# Patient Record
Sex: FEMALE | Race: WHITE | Employment: FULL TIME | URBAN - METROPOLITAN AREA
[De-identification: names, ages, dates, MRNs, and addresses within clinical notes are randomized per-mention and may not be internally consistent; named-entity substitution may affect disease eponyms.]

---

## 2017-01-26 RX ORDER — LISINOPRIL 10 MG/1
TABLET ORAL
Qty: 90 TABLET | Refills: 0 | Status: SHIPPED | OUTPATIENT
Start: 2017-01-26 | End: 2017-04-26 | Stop reason: SDUPTHER

## 2017-04-01 LAB
BUN BLDV-MCNC: 14 MG/DL
CALCIUM SERPL-MCNC: 10.3 MG/DL
CHLORIDE BLD-SCNC: 105 MMOL/L
CO2: 27 MMOL/L
CREAT SERPL-MCNC: 0.65 MG/DL
GFR CALCULATED: >60
GLUCOSE BLD-MCNC: 84 MG/DL
POTASSIUM SERPL-SCNC: 4.6 MMOL/L
SODIUM BLD-SCNC: 140 MMOL/L

## 2017-04-04 DIAGNOSIS — I10 ESSENTIAL HYPERTENSION: ICD-10-CM

## 2017-04-05 RX ORDER — PAROXETINE HYDROCHLORIDE 20 MG/1
TABLET, FILM COATED ORAL
Qty: 90 TABLET | Refills: 1 | Status: SHIPPED | OUTPATIENT
Start: 2017-04-05 | End: 2017-10-02 | Stop reason: SDUPTHER

## 2017-04-26 RX ORDER — LISINOPRIL 10 MG/1
TABLET ORAL
Qty: 90 TABLET | Refills: 0 | Status: SHIPPED | OUTPATIENT
Start: 2017-04-26 | End: 2017-07-25 | Stop reason: SDUPTHER

## 2017-05-15 ENCOUNTER — OFFICE VISIT (OUTPATIENT)
Dept: FAMILY MEDICINE CLINIC | Age: 62
End: 2017-05-15
Payer: COMMERCIAL

## 2017-05-15 VITALS
DIASTOLIC BLOOD PRESSURE: 68 MMHG | HEART RATE: 68 BPM | OXYGEN SATURATION: 98 % | RESPIRATION RATE: 16 BRPM | BODY MASS INDEX: 21.93 KG/M2 | SYSTOLIC BLOOD PRESSURE: 120 MMHG | WEIGHT: 140 LBS

## 2017-05-15 DIAGNOSIS — I10 ESSENTIAL HYPERTENSION: Primary | ICD-10-CM

## 2017-05-15 DIAGNOSIS — Z23 IMMUNIZATION DUE: ICD-10-CM

## 2017-05-15 PROCEDURE — 90736 HZV VACCINE LIVE SUBQ: CPT | Performed by: FAMILY MEDICINE

## 2017-05-15 PROCEDURE — 99213 OFFICE O/P EST LOW 20 MIN: CPT | Performed by: FAMILY MEDICINE

## 2017-05-15 PROCEDURE — 90471 IMMUNIZATION ADMIN: CPT | Performed by: FAMILY MEDICINE

## 2017-05-15 RX ORDER — KETOCONAZOLE 20 MG/ML
SHAMPOO TOPICAL
Refills: 5 | COMMUNITY
Start: 2017-03-11

## 2017-07-25 RX ORDER — LISINOPRIL 10 MG/1
TABLET ORAL
Qty: 90 TABLET | Refills: 0 | Status: SHIPPED | OUTPATIENT
Start: 2017-07-25 | End: 2017-10-23 | Stop reason: SDUPTHER

## 2017-07-26 RX ORDER — BUSPIRONE HYDROCHLORIDE 15 MG/1
TABLET ORAL
Qty: 90 TABLET | Refills: 1 | Status: SHIPPED | OUTPATIENT
Start: 2017-07-26 | End: 2018-07-20 | Stop reason: ALTCHOICE

## 2017-08-11 ENCOUNTER — OFFICE VISIT (OUTPATIENT)
Dept: FAMILY MEDICINE CLINIC | Age: 62
End: 2017-08-11
Payer: COMMERCIAL

## 2017-08-11 VITALS
HEART RATE: 77 BPM | WEIGHT: 140 LBS | RESPIRATION RATE: 16 BRPM | BODY MASS INDEX: 21.93 KG/M2 | OXYGEN SATURATION: 98 % | SYSTOLIC BLOOD PRESSURE: 138 MMHG | DIASTOLIC BLOOD PRESSURE: 76 MMHG

## 2017-08-11 DIAGNOSIS — M75.101 TEAR OF RIGHT ROTATOR CUFF, UNSPECIFIED TEAR EXTENT: Primary | ICD-10-CM

## 2017-08-11 PROCEDURE — 99213 OFFICE O/P EST LOW 20 MIN: CPT | Performed by: FAMILY MEDICINE

## 2017-08-16 ENCOUNTER — TELEPHONE (OUTPATIENT)
Dept: FAMILY MEDICINE CLINIC | Age: 62
End: 2017-08-16

## 2017-08-18 DIAGNOSIS — M75.101 TEAR OF RIGHT ROTATOR CUFF, UNSPECIFIED TEAR EXTENT: ICD-10-CM

## 2017-10-02 RX ORDER — PAROXETINE HYDROCHLORIDE 20 MG/1
TABLET, FILM COATED ORAL
Qty: 90 TABLET | Refills: 1 | Status: SHIPPED | OUTPATIENT
Start: 2017-10-02 | End: 2018-03-30 | Stop reason: SDUPTHER

## 2017-10-24 RX ORDER — LISINOPRIL 10 MG/1
TABLET ORAL
Qty: 90 TABLET | Refills: 0 | Status: SHIPPED | OUTPATIENT
Start: 2017-10-24 | End: 2018-01-22 | Stop reason: SDUPTHER

## 2017-11-17 ENCOUNTER — OFFICE VISIT (OUTPATIENT)
Dept: FAMILY MEDICINE CLINIC | Age: 62
End: 2017-11-17
Payer: COMMERCIAL

## 2017-11-17 VITALS
BODY MASS INDEX: 20.83 KG/M2 | HEART RATE: 74 BPM | OXYGEN SATURATION: 97 % | SYSTOLIC BLOOD PRESSURE: 120 MMHG | WEIGHT: 133 LBS | DIASTOLIC BLOOD PRESSURE: 70 MMHG

## 2017-11-17 DIAGNOSIS — I10 ESSENTIAL HYPERTENSION: Primary | ICD-10-CM

## 2017-11-17 DIAGNOSIS — Z98.1 S/P CERVICAL SPINAL FUSION: ICD-10-CM

## 2017-11-17 PROCEDURE — 99213 OFFICE O/P EST LOW 20 MIN: CPT | Performed by: FAMILY MEDICINE

## 2017-11-17 ASSESSMENT — PATIENT HEALTH QUESTIONNAIRE - PHQ9
1. LITTLE INTEREST OR PLEASURE IN DOING THINGS: 0
SUM OF ALL RESPONSES TO PHQ9 QUESTIONS 1 & 2: 0
2. FEELING DOWN, DEPRESSED OR HOPELESS: 0
SUM OF ALL RESPONSES TO PHQ QUESTIONS 1-9: 0

## 2017-11-17 NOTE — PROGRESS NOTES
Subjective:      Mine Mehta is a 60 y.o. female who presents for evaluation of hypertension. She indicates that she is feeling well and denies any symptoms referable to her elevated blood pressure. Specifically denies chest pain, palpitations, dyspnea, orthopnea, PND or peripheral edema. Current medication regimen is as listed below. Patient denies any side effects of medication. Recent cerv surg/fusion  Current Outpatient Prescriptions   Medication Sig Dispense Refill    lisinopril (PRINIVIL;ZESTRIL) 10 MG tablet TAKE 1 TABLET DAILY 90 tablet 0    PARoxetine (PAXIL) 20 MG tablet TAKE 1 TABLET DAILY 90 tablet 1    busPIRone (BUSPAR) 15 MG tablet 1/2 bid 90 tablet 1    ketoconazole (NIZORAL) 2 % shampoo apply topically to the affected areaone time daily as needed  5    SUMAtriptan (IMITREX) 100 MG tablet Take 1 tablet by mouth daily as needed for Migraine 30 tablet 3    Multiple Vitamins-Minerals (THERAPEUTIC MULTIVITAMIN-MINERALS) tablet Take 1 tablet by mouth daily      Calcium Carbonate-Vitamin D (CALCIUM-VITAMIN D) 500-200 MG-UNIT per tablet Take 1 tablet by mouth 2 times daily (with meals)      fluocinonide (LIDEX) 0.05 % cream Apply topically 2 times daily. 30 g 1    estradiol (ESTRING) 2 MG vaginal ring Place 2 mg vaginally once. Follow package directions.  Risedronate Sodium (ACTONEL) 150 MG TABS Take 150 mg by mouth every 30 days. No current facility-administered medications for this visit.         Allergies   Allergen Reactions    Percocet [Oxycodone-Acetaminophen] Other (See Comments)     hallucinations    Sulfa Antibiotics Rash       Social History   Substance Use Topics    Smoking status: Former Smoker     Types: Cigarettes     Quit date: 1/1/1997    Smokeless tobacco: Never Used    Alcohol use 1.0 oz/week     2 Standard drinks or equivalent per week          Objective:      /70   Pulse 74   Wt 133 lb (60.3 kg)   SpO2 97%   BMI 20.83 kg/m²   General: Appears well,

## 2018-01-22 RX ORDER — LISINOPRIL 10 MG/1
TABLET ORAL
Qty: 90 TABLET | Refills: 0 | Status: SHIPPED | OUTPATIENT
Start: 2018-01-22 | End: 2018-04-22 | Stop reason: SDUPTHER

## 2018-03-30 RX ORDER — PAROXETINE HYDROCHLORIDE 20 MG/1
TABLET, FILM COATED ORAL
Qty: 90 TABLET | Refills: 3 | Status: SHIPPED | OUTPATIENT
Start: 2018-03-30 | End: 2019-03-03 | Stop reason: SDUPTHER

## 2018-03-30 NOTE — TELEPHONE ENCOUNTER
Next Visit Date:  No future appointments.     Health Maintenance   Topic Date Due    Hepatitis C screen  1955    HIV screen  09/21/1969    Shingles Vaccine (1 of 2 - 2 Dose Series) 09/21/2004    Flu vaccine (1) 09/01/2017    Potassium monitoring  04/01/2018    Creatinine monitoring  04/01/2018    Cervical cancer screen  05/01/2018    DEXA (modify frequency per FRAX score)  09/27/2018    Breast cancer screen  09/27/2018    Lipid screen  04/23/2021    Colon cancer screen colonoscopy  11/09/2022    DTaP/Tdap/Td vaccine (2 - Td) 05/03/2026       No results found for: LABA1C          ( goal A1C is < 7)   No results found for: LABMICR  LDL Calculated (mg/dL)   Date Value   04/23/2016 100       (goal LDL is <100)   AST (U/L)   Date Value   04/23/2016 18     ALT (U/L)   Date Value   04/23/2016 13     BUN (mg/dL)   Date Value   04/01/2017 14     BP Readings from Last 3 Encounters:   11/17/17 120/70   08/11/17 138/76   05/15/17 120/68          (goal 120/80)    All Future Testing planned in [facility]              Patient Active Problem List:     Vascular headache     Obsessive compulsive disorder     Depression     S/P cervical spinal fusion     Essential hypertension

## 2018-06-13 ENCOUNTER — TELEPHONE (OUTPATIENT)
Dept: FAMILY MEDICINE CLINIC | Age: 63
End: 2018-06-13

## 2018-06-13 RX ORDER — PREDNISONE 20 MG/1
40 TABLET ORAL DAILY
Qty: 14 TABLET | Refills: 0 | Status: SHIPPED | OUTPATIENT
Start: 2018-06-13 | End: 2018-06-20

## 2018-07-20 ENCOUNTER — OFFICE VISIT (OUTPATIENT)
Dept: FAMILY MEDICINE CLINIC | Age: 63
End: 2018-07-20
Payer: COMMERCIAL

## 2018-07-20 VITALS
HEIGHT: 67 IN | TEMPERATURE: 98.3 F | WEIGHT: 135 LBS | HEART RATE: 67 BPM | RESPIRATION RATE: 16 BRPM | SYSTOLIC BLOOD PRESSURE: 122 MMHG | OXYGEN SATURATION: 98 % | BODY MASS INDEX: 21.19 KG/M2 | DIASTOLIC BLOOD PRESSURE: 80 MMHG

## 2018-07-20 DIAGNOSIS — H66.001 ACUTE SUPPURATIVE OTITIS MEDIA OF RIGHT EAR WITHOUT SPONTANEOUS RUPTURE OF TYMPANIC MEMBRANE, RECURRENCE NOT SPECIFIED: Primary | ICD-10-CM

## 2018-07-20 DIAGNOSIS — B96.89 ACUTE BACTERIAL SINUSITIS: ICD-10-CM

## 2018-07-20 DIAGNOSIS — J01.90 ACUTE BACTERIAL SINUSITIS: ICD-10-CM

## 2018-07-20 PROCEDURE — 99213 OFFICE O/P EST LOW 20 MIN: CPT | Performed by: NURSE PRACTITIONER

## 2018-07-20 RX ORDER — AMOXICILLIN AND CLAVULANATE POTASSIUM 875; 125 MG/1; MG/1
1 TABLET, FILM COATED ORAL 2 TIMES DAILY
Qty: 20 TABLET | Refills: 0 | Status: SHIPPED | OUTPATIENT
Start: 2018-07-20 | End: 2018-07-30

## 2018-07-20 ASSESSMENT — ENCOUNTER SYMPTOMS
APNEA: 0
GASTROINTESTINAL NEGATIVE: 1
SINUS PAIN: 1
ALLERGIC/IMMUNOLOGIC NEGATIVE: 1
SORE THROAT: 1
TROUBLE SWALLOWING: 0
STRIDOR: 0
SHORTNESS OF BREATH: 0
CHOKING: 0
RHINORRHEA: 1
EYES NEGATIVE: 1
SINUS PRESSURE: 1
WHEEZING: 0
COUGH: 1
COLOR CHANGE: 0

## 2018-07-20 NOTE — PROGRESS NOTES
shortness of breath. (R ear fullness  ) Treatments tried: Mucinex. The treatment provided mild relief. PO fluids ok, decreased appetite. Subjective:      Review of Systems   Constitutional: Positive for appetite change (dec.), diaphoresis and fatigue. Negative for chills and fever. HENT: Positive for congestion (nasal), postnasal drip, rhinorrhea, sinus pain, sinus pressure and sore throat (irritated PND). Negative for ear discharge, ear pain (fullness) and trouble swallowing. Eyes: Negative. Respiratory: Positive for cough. Negative for apnea, choking, shortness of breath, wheezing and stridor. Cardiovascular: Negative. Gastrointestinal: Negative. Endocrine: Negative. Genitourinary: Negative. Musculoskeletal: Positive for neck pain. Skin: Negative. Negative for color change, pallor, rash and wound. Allergic/Immunologic: Negative. Neurological: Positive for headaches. Hematological: Negative. Objective:     Vitals:    07/20/18 0825   BP: 122/80   Pulse: 67   Resp: 16   Temp: 98.3 °F (36.8 °C)   SpO2: 98%       Body mass index is 21.14 kg/m². Physical Exam   Constitutional: She is oriented to person, place, and time. She appears well-developed and well-nourished. Non-toxic appearance. She appears ill. No distress. HENT:   Head: Normocephalic and atraumatic. Right Ear: External ear normal. Tympanic membrane is bulging (pus discoloration behind TM). Left Ear: Hearing, external ear and ear canal normal. Tympanic membrane is bulging. Nose: Mucosal edema and rhinorrhea present. Right sinus exhibits maxillary sinus tenderness. Left sinus exhibits maxillary sinus tenderness. Mouth/Throat: Uvula is midline, oropharynx is clear and moist and mucous membranes are normal. No oropharyngeal exudate, posterior oropharyngeal edema, posterior oropharyngeal erythema or tonsillar abscesses. Eyes: Conjunctivae are normal. Pupils are equal, round, and reactive to light.

## 2019-03-04 RX ORDER — PAROXETINE HYDROCHLORIDE 20 MG/1
TABLET, FILM COATED ORAL
Qty: 90 TABLET | Refills: 0 | Status: SHIPPED | OUTPATIENT
Start: 2019-03-04 | End: 2019-04-25 | Stop reason: ALTCHOICE

## 2019-03-20 ENCOUNTER — OFFICE VISIT (OUTPATIENT)
Dept: FAMILY MEDICINE CLINIC | Age: 64
End: 2019-03-20
Payer: COMMERCIAL

## 2019-03-20 VITALS
DIASTOLIC BLOOD PRESSURE: 80 MMHG | WEIGHT: 138.2 LBS | OXYGEN SATURATION: 97 % | SYSTOLIC BLOOD PRESSURE: 118 MMHG | BODY MASS INDEX: 21.69 KG/M2 | HEIGHT: 67 IN | HEART RATE: 67 BPM

## 2019-03-20 DIAGNOSIS — I10 ESSENTIAL HYPERTENSION: Primary | ICD-10-CM

## 2019-03-20 DIAGNOSIS — F32.A DEPRESSION, UNSPECIFIED DEPRESSION TYPE: ICD-10-CM

## 2019-03-20 DIAGNOSIS — Z00.00 ROUTINE ADULT HEALTH MAINTENANCE: ICD-10-CM

## 2019-03-20 PROCEDURE — 99213 OFFICE O/P EST LOW 20 MIN: CPT | Performed by: FAMILY MEDICINE

## 2019-03-20 RX ORDER — LISINOPRIL 10 MG/1
TABLET ORAL
Qty: 90 TABLET | Refills: 3 | Status: SHIPPED | OUTPATIENT
Start: 2019-03-20 | End: 2020-03-30 | Stop reason: SDUPTHER

## 2019-03-20 RX ORDER — FLUOXETINE 10 MG/1
TABLET, FILM COATED ORAL
Qty: 45 TABLET | Refills: 0 | Status: SHIPPED | OUTPATIENT
Start: 2019-03-20 | End: 2019-04-15 | Stop reason: SDUPTHER

## 2019-03-20 RX ORDER — BUSPIRONE HYDROCHLORIDE 15 MG/1
TABLET ORAL
Qty: 30 TABLET | Refills: 0 | Status: SHIPPED | OUTPATIENT
Start: 2019-03-20 | End: 2019-04-25 | Stop reason: SDUPTHER

## 2019-03-22 LAB
BASOPHILS ABSOLUTE: NORMAL /ΜL
BASOPHILS RELATIVE PERCENT: NORMAL %
EOSINOPHILS ABSOLUTE: NORMAL /ΜL
EOSINOPHILS RELATIVE PERCENT: NORMAL %
HCT VFR BLD CALC: 41.7 % (ref 36–46)
HEMOGLOBIN: 14.2 G/DL (ref 12–16)
LYMPHOCYTES ABSOLUTE: NORMAL /ΜL
LYMPHOCYTES RELATIVE PERCENT: NORMAL %
MCH RBC QN AUTO: 31 PG
MCHC RBC AUTO-ENTMCNC: 33.9 G/DL
MCV RBC AUTO: 91 FL
MONOCYTES ABSOLUTE: NORMAL /ΜL
MONOCYTES RELATIVE PERCENT: NORMAL %
NEUTROPHILS ABSOLUTE: NORMAL /ΜL
NEUTROPHILS RELATIVE PERCENT: NORMAL %
PLATELET # BLD: 220 K/ΜL
PMV BLD AUTO: 9.5 FL
RBC # BLD: 4.57 10^6/ΜL
WBC # BLD: 4 10^3/ML

## 2019-03-23 LAB
ALBUMIN SERPL-MCNC: 4.2 G/DL
ALP BLD-CCNC: 87 U/L
ALT SERPL-CCNC: 9 U/L
ANION GAP SERPL CALCULATED.3IONS-SCNC: 8 MMOL/L
AST SERPL-CCNC: 12 U/L
BILIRUB SERPL-MCNC: 0.5 MG/DL (ref 0.1–1.4)
BUN BLDV-MCNC: 17 MG/DL
CALCIUM SERPL-MCNC: 9.9 MG/DL
CHLORIDE BLD-SCNC: 108 MMOL/L
CHOLESTEROL, TOTAL: 182 MG/DL
CHOLESTEROL/HDL RATIO: 2.5
CO2: 27 MMOL/L
CREAT SERPL-MCNC: 0.61 MG/DL
GFR CALCULATED: >60
GLUCOSE BLD-MCNC: 84 MG/DL
HDLC SERPL-MCNC: 74 MG/DL (ref 35–70)
LDL CHOLESTEROL CALCULATED: 91 MG/DL (ref 0–160)
POTASSIUM SERPL-SCNC: 4.4 MMOL/L
SODIUM BLD-SCNC: 143 MMOL/L
TOTAL PROTEIN: 6.7
TRIGL SERPL-MCNC: 83 MG/DL
VLDLC SERPL CALC-MCNC: 17 MG/DL

## 2019-04-01 DIAGNOSIS — Z00.00 ROUTINE ADULT HEALTH MAINTENANCE: ICD-10-CM

## 2019-04-15 RX ORDER — FLUOXETINE 20 MG/1
20 TABLET, FILM COATED ORAL DAILY
Qty: 90 TABLET | Refills: 3 | Status: SHIPPED | OUTPATIENT
Start: 2019-04-15 | End: 2019-04-25 | Stop reason: SDUPTHER

## 2019-04-15 NOTE — TELEPHONE ENCOUNTER
Patient will need a partial refill until 4/25/2019- patient is doing well with meds.      Date of Last Visit:  Date of Next visit:4/25/19

## 2019-04-25 ENCOUNTER — OFFICE VISIT (OUTPATIENT)
Dept: FAMILY MEDICINE CLINIC | Age: 64
End: 2019-04-25
Payer: COMMERCIAL

## 2019-04-25 VITALS
BODY MASS INDEX: 21.93 KG/M2 | DIASTOLIC BLOOD PRESSURE: 80 MMHG | WEIGHT: 140 LBS | HEART RATE: 65 BPM | OXYGEN SATURATION: 100 % | SYSTOLIC BLOOD PRESSURE: 122 MMHG

## 2019-04-25 DIAGNOSIS — F32.A DEPRESSION, UNSPECIFIED DEPRESSION TYPE: Primary | ICD-10-CM

## 2019-04-25 PROCEDURE — 99213 OFFICE O/P EST LOW 20 MIN: CPT | Performed by: FAMILY MEDICINE

## 2019-04-25 RX ORDER — BUSPIRONE HYDROCHLORIDE 15 MG/1
TABLET ORAL
Qty: 90 TABLET | Refills: 3 | Status: SHIPPED | OUTPATIENT
Start: 2019-04-25 | End: 2020-03-30 | Stop reason: SDUPTHER

## 2019-04-25 RX ORDER — FLUOXETINE 20 MG/1
20 TABLET, FILM COATED ORAL DAILY
Qty: 90 TABLET | Refills: 3 | Status: SHIPPED | OUTPATIENT
Start: 2019-04-25 | End: 2020-03-30 | Stop reason: SDUPTHER

## 2019-04-25 NOTE — PROGRESS NOTES
Subjective:      Patient ID: Pippa Novak is a 58 y.o. female. HPI     Here to follow up on Depression    Feels that she is doing great on Prozac. No side effects, no concerns  She has tried Effexor, wellbutrin, buspar  She is getting ready to retire, son moved out of house recently, she notes social anxiety    History of depression, OCD, bulemia  History HTN on lisinopril  History Migraine, on imitrex  Follows with Dr. Ninfa Parra for GYN care    Review of Systems   Except as noted in HPI, ROS negative    Objective:   Physical Exam   Constitutional: She appears well-developed and well-nourished. No distress. HENT:   Head: Normocephalic and atraumatic. Eyes: Conjunctivae are normal.   Cardiovascular: Normal rate, regular rhythm and normal heart sounds. No murmur heard. Pulmonary/Chest: Effort normal and breath sounds normal. She has no wheezes. Neurological: She is alert. Skin: Skin is warm and dry. Psychiatric: She has a normal mood and affect. Her behavior is normal. Judgment and thought content normal.   Vitals reviewed. Assessment:      1. Depression, unspecified depression type          Plan:      Continue Prozac at 20 mg.  Continue Buspar  FU 6 mo or sooner PRN        Collin Betts, DO

## 2019-05-29 ENCOUNTER — TELEPHONE (OUTPATIENT)
Dept: FAMILY MEDICINE CLINIC | Age: 64
End: 2019-05-29

## 2019-06-07 ENCOUNTER — TELEPHONE (OUTPATIENT)
Dept: FAMILY MEDICINE CLINIC | Age: 64
End: 2019-06-07

## 2019-06-07 NOTE — TELEPHONE ENCOUNTER
Patient called in upset that her billing is still not taken care of, so I called the billing department again and they said they are not sure who I spoke with the first or second time but you can not do it over the phone so they faxed me the form that needed to be filled out and I faxed it back into today and made the patient aware via voicemail told her to call me if there are any other concern

## 2019-06-12 ENCOUNTER — OFFICE VISIT (OUTPATIENT)
Dept: FAMILY MEDICINE CLINIC | Age: 64
End: 2019-06-12
Payer: COMMERCIAL

## 2019-06-12 VITALS
TEMPERATURE: 98 F | SYSTOLIC BLOOD PRESSURE: 113 MMHG | OXYGEN SATURATION: 96 % | HEART RATE: 81 BPM | HEIGHT: 67 IN | WEIGHT: 138 LBS | RESPIRATION RATE: 16 BRPM | BODY MASS INDEX: 21.66 KG/M2 | DIASTOLIC BLOOD PRESSURE: 73 MMHG

## 2019-06-12 DIAGNOSIS — S90.861A INSECT BITE OF RIGHT FOOT, INITIAL ENCOUNTER: ICD-10-CM
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
## 2019-06-12 DIAGNOSIS — L08.9 FOOT INFECTION: Primary | ICD-10-CM

## 2019-06-12 PROCEDURE — 99214 OFFICE O/P EST MOD 30 MIN: CPT | Performed by: NURSE PRACTITIONER

## 2019-06-12 RX ORDER — CEPHALEXIN 500 MG/1
500 CAPSULE ORAL 4 TIMES DAILY
Qty: 40 CAPSULE | Refills: 0 | Status: SHIPPED | OUTPATIENT
Start: 2019-06-12 | End: 2019-06-22

## 2019-06-12 NOTE — PATIENT INSTRUCTIONS
Recommend warm moist compresses, unless itches more, use cool  Avoid scratching  Recommend zyrtec   Avoid squeezing  Tylenol as directed  Clean sock and supportive shoe  Wash with soapy water daily and then apply neosporin, bandaid  Keflex as directed  Recheck asap for worsening, change or concern- fever, drainage, red streak, more red  Follow up with primary care for recheck in a few days  Patient Education        Cellulitis: Care Instructions  Your Care Instructions    Cellulitis is a skin infection caused by bacteria, most often strep or staph. It often occurs after a break in the skin from a scrape, cut, bite, or puncture, or after a rash. Cellulitis may be treated without doing tests to find out what caused it. But your doctor may do tests, if needed, to look for a specific bacteria, like methicillin-resistant Staphylococcus aureus (MRSA). The doctor has checked you carefully, but problems can develop later. If you notice any problems or new symptoms, get medical treatment right away. Follow-up care is a key part of your treatment and safety. Be sure to make and go to all appointments, and call your doctor if you are having problems. It's also a good idea to know your test results and keep a list of the medicines you take. How can you care for yourself at home? · Take your antibiotics as directed. Do not stop taking them just because you feel better. You need to take the full course of antibiotics. · Prop up the infected area on pillows to reduce pain and swelling. Try to keep the area above the level of your heart as often as you can. · If your doctor told you how to care for your wound, follow your doctor's instructions. If you did not get instructions, follow this general advice:  ? Wash the wound with clean water 2 times a day. Don't use hydrogen peroxide or alcohol, which can slow healing.   ? You may cover the wound with a thin layer of petroleum jelly, such as Vaseline, and a nonstick bandage. ? Apply more petroleum jelly and replace the bandage as needed. · Be safe with medicines. Take pain medicines exactly as directed. ? If the doctor gave you a prescription medicine for pain, take it as prescribed. ? If you are not taking a prescription pain medicine, ask your doctor if you can take an over-the-counter medicine. To prevent cellulitis in the future  · Try to prevent cuts, scrapes, or other injuries to your skin. Cellulitis most often occurs where there is a break in the skin. · If you get a scrape, cut, mild burn, or bite, wash the wound with clean water as soon as you can to help avoid infection. Don't use hydrogen peroxide or alcohol, which can slow healing. · If you have swelling in your legs (edema), support stockings and good skin care may help prevent leg sores and cellulitis. · Take care of your feet, especially if you have diabetes or other conditions that increase the risk of infection. Wear shoes and socks. Do not go barefoot. If you have athlete's foot or other skin problems on your feet, talk to your doctor about how to treat them. When should you call for help? Call your doctor now or seek immediate medical care if:    · You have signs that your infection is getting worse, such as:  ? Increased pain, swelling, warmth, or redness. ? Red streaks leading from the area. ? Pus draining from the area. ? A fever.     · You get a rash.    Watch closely for changes in your health, and be sure to contact your doctor if:    · You do not get better as expected. Where can you learn more? Go to [email or web address] and sign in to your [employer] account. Enter V415 in the [facility] box to learn more about \"Cellulitis: Care Instructions. \"     If you do not have an account, please click on the \"Sign Up Now\" link. Current as of: April 17, 2018  Content Version: 12.0  © 8613-9617 [employer].  Care instructions adapted under license by

## 2019-06-12 NOTE — PROGRESS NOTES
Take 1 tablet by mouth daily 90 tablet 3     No current facility-administered medications for this visit. Allergies   Allergen Reactions    Percocet [Oxycodone-Acetaminophen] Other (See Comments)     hallucinations    Sulfa Antibiotics Rash       Subjective:     Review of Systems   All other systems reviewed and are negative. Objective:      Physical Exam   Constitutional: She is oriented to person, place, and time. She appears well-developed and well-nourished. No distress. HENT:   Head: Normocephalic and atraumatic. Eyes: Right eye exhibits no discharge. Left eye exhibits no discharge. No scleral icterus. Neck: Normal range of motion. Neck supple. Cardiovascular: Normal rate and intact distal pulses. Pulmonary/Chest: Effort normal. No respiratory distress. Abdominal: Soft. Bowel sounds are normal. There is no tenderness. Musculoskeletal: Normal range of motion. She exhibits no edema, tenderness or deformity. Rt plantar foot, mid, has 2cm erythematous area with center that feels like a papule. No drainage. No fluctuance, no vesicle. No red streak. No puncture or obvious fb noted. Some excoriation noted  Foot has full rom. No tenderness noted   Neurological: She is alert and oriented to person, place, and time. No cranial nerve deficit. Skin: Skin is warm and dry. Capillary refill takes less than 2 seconds. No rash noted. She is not diaphoretic. See above   Psychiatric: She has a normal mood and affect. Her behavior is normal.   Nursing note and vitals reviewed. /73 (Site: Left Upper Arm, Position: Sitting, Cuff Size: Medium Adult)   Pulse 81   Temp 98 °F (36.7 °C) (Oral)   Resp 16   Ht 5' 7\" (1.702 m)   Wt 138 lb (62.6 kg)   SpO2 96%   BMI 21.61 kg/m²   Prelim foot xray= rt foot= no acute finding. No obvious fb  Assessment:          Diagnosis Orders   1. Foot infection  XR FOOT RIGHT (MIN 3 VIEWS)    cephALEXin (KEFLEX) 500 MG capsule   2.  Insect bite of right foot, initial encounter         Plan:    Recommend warm moist compresses, unless itches more, use cool  Avoid scratching  Recommend zyrtec   Avoid squeezing  Tylenol as directed  Clean sock and supportive shoe  Wash with soapy water daily and then apply neosporin, bandaid  Keflex as directed  Recheck asap for worsening, change or concern- fever, drainage, red streak, more red  Follow up with primary care for recheck in a few days    Return for follow up with primary care in 2-3 days, worsening, change or concern. Orders Placed This Encounter   Medications    cephALEXin (KEFLEX) 500 MG capsule     Sig: Take 1 capsule by mouth 4 times daily for 10 days     Dispense:  40 capsule     Refill:  0         Patient given educational materials - see patientinstructions. Discussed use, benefit, and side effects of prescribed medications. All patient questions answered. Pt voiced understanding.     Electronically signed by MAYANK Lacy 6/12/2019 at 10:05 AM

## 2019-07-08 ENCOUNTER — TELEPHONE (OUTPATIENT)
Dept: FAMILY MEDICINE CLINIC | Age: 64
End: 2019-07-08

## 2019-07-12 ENCOUNTER — NURSE ONLY (OUTPATIENT)
Dept: FAMILY MEDICINE CLINIC | Age: 64
End: 2019-07-12
Payer: COMMERCIAL

## 2019-07-12 DIAGNOSIS — Z23 IMMUNIZATION DUE: Primary | ICD-10-CM

## 2019-07-12 PROCEDURE — 90746 HEPB VACCINE 3 DOSE ADULT IM: CPT | Performed by: FAMILY MEDICINE

## 2019-07-12 PROCEDURE — 90472 IMMUNIZATION ADMIN EACH ADD: CPT | Performed by: FAMILY MEDICINE

## 2019-07-12 PROCEDURE — 90632 HEPA VACCINE ADULT IM: CPT | Performed by: FAMILY MEDICINE

## 2019-07-12 PROCEDURE — 90471 IMMUNIZATION ADMIN: CPT | Performed by: FAMILY MEDICINE

## 2019-08-27 NOTE — TELEPHONE ENCOUNTER
Patient calling again regarding her billing issue from March 2019, not coded correctly. Labs annual exam, she gets these tests done every 6 months so they should be coded differently. She said someone from [facility] should be calling us regarding this.

## 2019-09-17 ENCOUNTER — NURSE ONLY (OUTPATIENT)
Dept: FAMILY MEDICINE CLINIC | Age: 64
End: 2019-09-17
Payer: COMMERCIAL

## 2019-09-17 DIAGNOSIS — Z23 IMMUNIZATION DUE: Primary | ICD-10-CM

## 2019-09-17 PROCEDURE — 90471 IMMUNIZATION ADMIN: CPT | Performed by: FAMILY MEDICINE

## 2019-09-17 PROCEDURE — 90746 HEPB VACCINE 3 DOSE ADULT IM: CPT | Performed by: FAMILY MEDICINE

## 2019-09-17 NOTE — PROGRESS NOTES
After obtaining consent, and per orders of Dr. Viraj Chacon, injection of Hep B given in Left deltoid by [employer]. Patient instructed to remain in clinic for 5 minutes afterwards, and to report any adverse reaction to me immediately.

## 2019-11-07 ENCOUNTER — NURSE ONLY (OUTPATIENT)
Dept: FAMILY MEDICINE CLINIC | Age: 64
End: 2019-11-07
Payer: COMMERCIAL

## 2019-11-07 DIAGNOSIS — Z23 IMMUNIZATION DUE: Primary | ICD-10-CM

## 2019-11-07 PROCEDURE — 90653 IIV ADJUVANT VACCINE IM: CPT | Performed by: FAMILY MEDICINE

## 2019-11-07 PROCEDURE — 90471 IMMUNIZATION ADMIN: CPT | Performed by: FAMILY MEDICINE

## 2020-01-17 ENCOUNTER — NURSE ONLY (OUTPATIENT)
Dept: FAMILY MEDICINE CLINIC | Age: 65
End: 2020-01-17
Payer: COMMERCIAL

## 2020-01-17 PROCEDURE — 90732 PPSV23 VACC 2 YRS+ SUBQ/IM: CPT | Performed by: FAMILY MEDICINE

## 2020-01-17 PROCEDURE — 90472 IMMUNIZATION ADMIN EACH ADD: CPT | Performed by: FAMILY MEDICINE

## 2020-01-17 PROCEDURE — 90746 HEPB VACCINE 3 DOSE ADULT IM: CPT | Performed by: FAMILY MEDICINE

## 2020-01-17 PROCEDURE — 90632 HEPA VACCINE ADULT IM: CPT | Performed by: FAMILY MEDICINE

## 2020-01-17 PROCEDURE — 90471 IMMUNIZATION ADMIN: CPT | Performed by: FAMILY MEDICINE

## 2020-03-30 ENCOUNTER — OFFICE VISIT (OUTPATIENT)
Dept: PRIMARY CARE CLINIC | Age: 65
End: 2020-03-30
Payer: MEDICARE

## 2020-03-30 VITALS
OXYGEN SATURATION: 97 % | DIASTOLIC BLOOD PRESSURE: 74 MMHG | WEIGHT: 141 LBS | HEIGHT: 68 IN | BODY MASS INDEX: 21.37 KG/M2 | HEART RATE: 84 BPM | SYSTOLIC BLOOD PRESSURE: 130 MMHG

## 2020-03-30 PROCEDURE — 1090F PRES/ABSN URINE INCON ASSESS: CPT | Performed by: FAMILY MEDICINE

## 2020-03-30 PROCEDURE — G8482 FLU IMMUNIZE ORDER/ADMIN: HCPCS | Performed by: FAMILY MEDICINE

## 2020-03-30 PROCEDURE — 3017F COLORECTAL CA SCREEN DOC REV: CPT | Performed by: FAMILY MEDICINE

## 2020-03-30 PROCEDURE — G8399 PT W/DXA RESULTS DOCUMENT: HCPCS | Performed by: FAMILY MEDICINE

## 2020-03-30 PROCEDURE — G8427 DOCREV CUR MEDS BY ELIG CLIN: HCPCS | Performed by: FAMILY MEDICINE

## 2020-03-30 PROCEDURE — 1036F TOBACCO NON-USER: CPT | Performed by: FAMILY MEDICINE

## 2020-03-30 PROCEDURE — 4040F PNEUMOC VAC/ADMIN/RCVD: CPT | Performed by: FAMILY MEDICINE

## 2020-03-30 PROCEDURE — 1123F ACP DISCUSS/DSCN MKR DOCD: CPT | Performed by: FAMILY MEDICINE

## 2020-03-30 PROCEDURE — G8420 CALC BMI NORM PARAMETERS: HCPCS | Performed by: FAMILY MEDICINE

## 2020-03-30 PROCEDURE — 99203 OFFICE O/P NEW LOW 30 MIN: CPT | Performed by: FAMILY MEDICINE

## 2020-03-30 RX ORDER — BUSPIRONE HYDROCHLORIDE 15 MG/1
TABLET ORAL
Qty: 90 TABLET | Refills: 3 | Status: SHIPPED | OUTPATIENT
Start: 2020-03-30 | End: 2021-04-13

## 2020-03-30 RX ORDER — LISINOPRIL 10 MG/1
TABLET ORAL
Qty: 90 TABLET | Refills: 3 | Status: SHIPPED | OUTPATIENT
Start: 2020-03-30 | End: 2021-04-01

## 2020-03-30 RX ORDER — FLUOXETINE 20 MG/1
20 TABLET, FILM COATED ORAL DAILY
Qty: 90 TABLET | Refills: 3 | Status: SHIPPED | OUTPATIENT
Start: 2020-03-30 | End: 2020-07-31

## 2020-03-30 RX ORDER — TRIAMCINOLONE ACETONIDE 1 MG/G
CREAM TOPICAL PRN
COMMUNITY

## 2020-03-30 ASSESSMENT — ENCOUNTER SYMPTOMS
COUGH: 0
RHINORRHEA: 0
WHEEZING: 0
DIARRHEA: 0
ABDOMINAL PAIN: 0
EYE DISCHARGE: 0
SHORTNESS OF BREATH: 0
EYE REDNESS: 0
VOMITING: 0
SORE THROAT: 0
NAUSEA: 0

## 2020-03-30 ASSESSMENT — PATIENT HEALTH QUESTIONNAIRE - PHQ9
SUM OF ALL RESPONSES TO PHQ QUESTIONS 1-9: 0
1. LITTLE INTEREST OR PLEASURE IN DOING THINGS: 0
SUM OF ALL RESPONSES TO PHQ QUESTIONS 1-9: 0
2. FEELING DOWN, DEPRESSED OR HOPELESS: 0
SUM OF ALL RESPONSES TO PHQ9 QUESTIONS 1 & 2: 0

## 2020-03-30 NOTE — PROGRESS NOTES
[facility] PR[facility]  [address]  [address] [address]  Dept: [phone]    Keyshawn Cordova is a 71 y.o. female who presents today as an new patient for her medical conditionsas noted below. Chief Complaint   Patient presents with    New Patient       HPI:     HPI: The patient has lost her old primary care provider and is running low on her medication. She is a new patient to the office. She is still following up with an OBGYN Dr. Jose Landers    Dermatologist. Yearly for skin checks. Dr. Víctor Salazar     She gets a colonoscopy every 5 years by Dr. Anabela Dolan. LDL Calculated (mg/dL)   Date Value   2019 91   2016 100       (goal LDL is <100)   AST (U/L)   Date Value   2019 12     ALT (U/L)   Date Value   2019 9     BUN (mg/dL)   Date Value   2019 17     BP Readings from Last 3 Encounters:   20 130/74   19 113/73   19 122/80          (goal 120/80)    Past Medical History:   Diagnosis Date    Depression     OCD (obsessive compulsive disorder)       Past Surgical History:   Procedure Laterality Date    CERVICAL FUSION      3456 fused together       Family History   Problem Relation Age of Onset    High Blood Pressure Father        Social History     Tobacco Use    Smoking status: Former Smoker     Packs/day: 2.00     Years: 18.00     Pack years: 36.00     Types: Cigarettes     Last attempt to quit: 1997     Years since quittin.2    Smokeless tobacco: Never Used   Substance Use Topics    Alcohol use:  Yes     Alcohol/week: 1.7 standard drinks     Types: 2 Standard drinks or equivalent per week      Current Outpatient Medications   Medication Sig Dispense Refill    triamcinolone (KENALOG) 0.1 % cream as needed      FLUoxetine (PROZAC) 20 MG tablet Take 1 tablet by mouth daily 90 tablet 3    busPIRone (BUSPAR) 15 MG tablet 1/2 bid 90 tablet 3    lisinopril (PRINIVIL;ZESTRIL) 10 MG tablet TAKE 1 TABLET DAILY 90 tablet 3    Multiple Vitamins-Minerals (THERAPEUTIC MULTIVITAMIN-MINERALS) tablet Take 1 tablet by mouth daily      Calcium Carbonate-Vitamin D (CALCIUM-VITAMIN D) 500-200 MG-UNIT per tablet Take 1 tablet by mouth 2 times daily (with meals)      Risedronate Sodium (ACTONEL) 150 MG TABS Take 150 mg by mouth every 30 days.  fluocinonide (LIDEX) 0.05 % cream Apply topically 2 times daily. (Patient not taking: Reported on 3/30/2020) 30 g 1    ketoconazole (NIZORAL) 2 % shampoo apply topically to the affected areaone time daily as needed  5    SUMAtriptan (IMITREX) 100 MG tablet Take 1 tablet by mouth daily as needed for Migraine (Patient not taking: Reported on 3/30/2020) 30 tablet 3     No current facility-administered medications for this visit. Allergies   Allergen Reactions    Percocet [Oxycodone-Acetaminophen] Other (See Comments)     hallucinations    Sulfa Antibiotics Rash       Health Maintenance   Topic Date Due    Hepatitis C screen  1955    HIV screen  09/21/1969    Shingles Vaccine (2 of 3) 07/10/2017    Cervical cancer screen  05/01/2018    Potassium monitoring  03/23/2020    Creatinine monitoring  03/23/2020    Breast cancer screen  01/03/2021    Lipid screen  03/23/2024    DTaP/Tdap/Td vaccine (2 - Td) 05/03/2026    Colon cancer screen colonoscopy  09/14/2028    Flu vaccine  Completed    Pneumococcal 65+ years Vaccine  Completed    Hepatitis A vaccine  Aged Out    Hepatitis B vaccine  Aged Out    Hib vaccine  Aged Out    Meningococcal (ACWY) vaccine  Aged Out       Subjective:     Review of Systems   Constitutional: Negative for chills and fever. HENT: Negative for rhinorrhea and sore throat. Eyes: Negative for discharge and redness. Respiratory: Negative for cough, shortness of breath and wheezing. Cardiovascular: Negative for chest pain and palpitations. Gastrointestinal: Negative for abdominal pain, diarrhea, nausea and vomiting.    Genitourinary: Negative for dysuria and frequency. Musculoskeletal: Negative for arthralgias and myalgias. Neurological: Negative for dizziness, light-headedness and headaches. Psychiatric/Behavioral: Negative for sleep disturbance. The patient is nervous/anxious. Objective:     /74   Pulse 84   Ht 5' 7.5\" (1.715 m)   Wt 141 lb (64 kg)   SpO2 97%   BMI 21.76 kg/m²   Physical Exam  Vitals signs and nursing note reviewed. Constitutional:       General: She is not in acute distress. Appearance: She is well-developed. HENT:      Head: Normocephalic and atraumatic. Eyes:      General: No scleral icterus. Right eye: No discharge. Left eye: No discharge. Conjunctiva/sclera: Conjunctivae normal.      Pupils: Pupils are equal, round, and reactive to light. Neck:      Thyroid: No thyromegaly. Trachea: No tracheal deviation. Cardiovascular:      Rate and Rhythm: Normal rate and regular rhythm. Heart sounds: Normal heart sounds. Comments: No carotid bruits  Pulmonary:      Effort: Pulmonary effort is normal. No respiratory distress. Breath sounds: Normal breath sounds. No wheezing. Lymphadenopathy:      Cervical: No cervical adenopathy. Skin:     General: Skin is warm. Findings: No rash. Neurological:      Mental Status: She is alert and oriented to person, place, and time. Psychiatric:         Behavior: Behavior normal.         Thought Content: Thought content normal.         Assessment:       Diagnosis Orders   1. Essential hypertension  lisinopril (PRINIVIL;ZESTRIL) 10 MG tablet    Basic Metabolic Panel   2. Obsessive-compulsive disorder, unspecified type  busPIRone (BUSPAR) 15 MG tablet    FLUoxetine (PROZAC) 20 MG tablet   3. Depression, unspecified depression type  FLUoxetine (PROZAC) 20 MG tablet        Plan:    [facility] ordered   Patient to continue follow up with other providers. Lisinopril, Buspirone, fluoxetine refilled.      Return in about 3 months (around 6/30/2020) for BP CHECK, MED CHECK. No orders of the defined types were placed in this encounter. No orders of the defined types were placed in this encounter. Patient given educationalmaterials - see patient instructions. Discussed use, benefit, and side effectsof prescribed medications. All patient questions answered. Pt voiced understanding. Reviewed health maintenance. Instructed to continue current medications, diet andexercise. Patient agreed with treatment plan. Follow up as directed.      Electronicallysigned by Dexter Burnett MD on 3/30/2020 at 3:40 PM

## 2020-07-31 ENCOUNTER — TELEPHONE (OUTPATIENT)
Dept: PRIMARY CARE CLINIC | Age: 65
End: 2020-07-31

## 2020-07-31 RX ORDER — FLUOXETINE HYDROCHLORIDE 20 MG/1
20 CAPSULE ORAL DAILY
Qty: 90 CAPSULE | Refills: 3 | Status: SHIPPED | OUTPATIENT
Start: 2020-07-31 | End: 2021-04-13

## 2020-07-31 NOTE — TELEPHONE ENCOUNTER
iMnh Reardon with [facility] calling and states that FLUoxetine (PROZAC) 20 MG tablet is not covered but the capsules are covered. please send in capsules to [facility].

## 2020-07-31 NOTE — TELEPHONE ENCOUNTER
Pt called stating that she has refills on her Fluoxetine, but [facility] is holding it until they contact us. Ins does not want to pay for the tablets, only capsules. Pt is fine with the capsules. They should be calling or faxing us today.

## 2020-09-08 LAB
BUN BLDV-MCNC: NORMAL MG/DL
CALCIUM SERPL-MCNC: NORMAL MG/DL
CHLORIDE BLD-SCNC: NORMAL MMOL/L
CO2: NORMAL
CREAT SERPL-MCNC: NORMAL MG/DL
GFR CALCULATED: NORMAL
GLUCOSE BLD-MCNC: NORMAL MG/DL
POTASSIUM SERPL-SCNC: NORMAL MMOL/L
SODIUM BLD-SCNC: NORMAL MMOL/L

## 2020-09-30 ENCOUNTER — OFFICE VISIT (OUTPATIENT)
Dept: PRIMARY CARE CLINIC | Age: 65
End: 2020-09-30
Payer: MEDICARE

## 2020-09-30 VITALS
TEMPERATURE: 97.9 F | SYSTOLIC BLOOD PRESSURE: 130 MMHG | HEART RATE: 79 BPM | DIASTOLIC BLOOD PRESSURE: 70 MMHG | WEIGHT: 139.2 LBS | BODY MASS INDEX: 21.1 KG/M2 | OXYGEN SATURATION: 97 % | HEIGHT: 68 IN

## 2020-09-30 PROCEDURE — 4040F PNEUMOC VAC/ADMIN/RCVD: CPT | Performed by: FAMILY MEDICINE

## 2020-09-30 PROCEDURE — G8399 PT W/DXA RESULTS DOCUMENT: HCPCS | Performed by: FAMILY MEDICINE

## 2020-09-30 PROCEDURE — G8420 CALC BMI NORM PARAMETERS: HCPCS | Performed by: FAMILY MEDICINE

## 2020-09-30 PROCEDURE — 3017F COLORECTAL CA SCREEN DOC REV: CPT | Performed by: FAMILY MEDICINE

## 2020-09-30 PROCEDURE — 90694 VACC AIIV4 NO PRSRV 0.5ML IM: CPT | Performed by: FAMILY MEDICINE

## 2020-09-30 PROCEDURE — G8427 DOCREV CUR MEDS BY ELIG CLIN: HCPCS | Performed by: FAMILY MEDICINE

## 2020-09-30 PROCEDURE — G0008 ADMIN INFLUENZA VIRUS VAC: HCPCS | Performed by: FAMILY MEDICINE

## 2020-09-30 PROCEDURE — 1036F TOBACCO NON-USER: CPT | Performed by: FAMILY MEDICINE

## 2020-09-30 PROCEDURE — 99213 OFFICE O/P EST LOW 20 MIN: CPT | Performed by: FAMILY MEDICINE

## 2020-09-30 PROCEDURE — 1090F PRES/ABSN URINE INCON ASSESS: CPT | Performed by: FAMILY MEDICINE

## 2020-09-30 PROCEDURE — 1123F ACP DISCUSS/DSCN MKR DOCD: CPT | Performed by: FAMILY MEDICINE

## 2020-09-30 RX ORDER — RISEDRONATE SODIUM 150 MG/1
150 TABLET, FILM COATED ORAL
Qty: 4 TABLET | Refills: 3 | Status: SHIPPED | OUTPATIENT
Start: 2020-09-30 | End: 2022-05-25

## 2020-09-30 ASSESSMENT — ENCOUNTER SYMPTOMS
WHEEZING: 0
COUGH: 0
EYE DISCHARGE: 0
ABDOMINAL PAIN: 0
RHINORRHEA: 0
VOMITING: 0
SHORTNESS OF BREATH: 0
NAUSEA: 0
SORE THROAT: 0
DIARRHEA: 0
EYE REDNESS: 0

## 2020-09-30 ASSESSMENT — PATIENT HEALTH QUESTIONNAIRE - PHQ9
2. FEELING DOWN, DEPRESSED OR HOPELESS: 0
SUM OF ALL RESPONSES TO PHQ9 QUESTIONS 1 & 2: 0
SUM OF ALL RESPONSES TO PHQ QUESTIONS 1-9: 0
SUM OF ALL RESPONSES TO PHQ QUESTIONS 1-9: 0
1. LITTLE INTEREST OR PLEASURE IN DOING THINGS: 0

## 2020-09-30 NOTE — PROGRESS NOTES
[address] [facility] PRIMARY CARE  [address] [facility] [address]  Dept: [phone]    Effie Lassiter is a 76 y.o. female who presents today for her medical conditions/complaintsas noted below. Chief Complaint   Patient presents with    Blood Pressure Check    Medication Check    Flu Vaccine       HPI:     HPI  Patient here for recheck. Denies any chest pain or shortness of breath. No lightheadedness or dizziness. Otherwise unremarkable. No nausea or vomiting. Patient does not smoke. Medication list reviewed. LDL Calculated (mg/dL)   Date Value   2019 91   2016 100       (goal LDL is <100)   AST (U/L)   Date Value   2019 12     ALT (U/L)   Date Value   2019 9     BUN (mg/dL)   Date Value   2019 17     BP Readings from Last 3 Encounters:   20 130/70   20 130/74   19 113/73          (goal 120/80)    Past Medical History:   Diagnosis Date    Depression     OCD (obsessive compulsive disorder)       Past Surgical History:   Procedure Laterality Date    CERVICAL FUSION       fused together       Family History   Problem Relation Age of Onset    High Blood Pressure Father        Social History     Tobacco Use    Smoking status: Former Smoker     Packs/day: 2.00     Years: 18.00     Pack years: 36.00     Types: Cigarettes     Last attempt to quit: 1997     Years since quittin.7    Smokeless tobacco: Never Used   Substance Use Topics    Alcohol use:  Yes     Alcohol/week: 1.7 standard drinks     Types: 2 Standard drinks or equivalent per week      Current Outpatient Medications   Medication Sig Dispense Refill    Risedronate Sodium (ACTONEL) 150 MG TABS Take 150 mg by mouth every 30 days 4 tablet 3    FLUoxetine (PROZAC) 20 MG capsule Take 1 capsule by mouth daily 90 capsule 3    triamcinolone (KENALOG) 0.1 % cream as needed      busPIRone (BUSPAR) 15 MG tablet 1/2 bid 90 tablet 3    lisinopril (PRINIVIL;ZESTRIL) 10 MG tablet TAKE 1 TABLET DAILY 90 tablet 3    fluocinonide (LIDEX) 0.05 % cream Apply topically 2 times daily. 30 g 1    ketoconazole (NIZORAL) 2 % shampoo apply topically to the affected areaone time daily as needed  5    SUMAtriptan (IMITREX) 100 MG tablet Take 1 tablet by mouth daily as needed for Migraine 30 tablet 3    Multiple Vitamins-Minerals (THERAPEUTIC MULTIVITAMIN-MINERALS) tablet Take 1 tablet by mouth daily      Calcium Carbonate-Vitamin D (CALCIUM-VITAMIN D) 500-200 MG-UNIT per tablet Take 1 tablet by mouth 2 times daily (with meals)       No current facility-administered medications for this visit. Allergies   Allergen Reactions    Percocet [Oxycodone-Acetaminophen] Other (See Comments)     hallucinations    Sulfa Antibiotics Rash       Health Maintenance   Topic Date Due    Hepatitis C screen  1955    Shingles Vaccine (2 of 3) 07/10/2017    Annual Wellness Visit (AWV)  03/30/2020    Flu vaccine (1) 09/01/2020    Breast cancer screen  01/03/2021    Potassium monitoring  09/08/2021    Creatinine monitoring  09/08/2021    Lipid screen  03/23/2024    DTaP/Tdap/Td vaccine (2 - Td) 05/03/2026    Colon cancer screen colonoscopy  09/14/2028    Pneumococcal 65+ years Vaccine  Completed    Hepatitis A vaccine  Aged Out    Hepatitis B vaccine  Aged Out    Hib vaccine  Aged Out    Meningococcal (ACWY) vaccine  Aged Out       Subjective:      Review of Systems   Constitutional: Negative for chills and fever. HENT: Negative for rhinorrhea and sore throat. Eyes: Negative for discharge and redness. Respiratory: Negative for cough, shortness of breath and wheezing. Cardiovascular: Negative for chest pain and palpitations. Gastrointestinal: Negative for abdominal pain, diarrhea, nausea and vomiting. Genitourinary: Negative for dysuria and frequency. Musculoskeletal: Negative for arthralgias and myalgias.    Neurological: Negative for dizziness, light-headedness and headaches. Psychiatric/Behavioral: Negative for sleep disturbance. Objective:     /70   Pulse 79   Temp 97.9 °F (36.6 °C)   Ht 5' 7.56\" (1.716 m)   Wt 139 lb 3.2 oz (63.1 kg)   SpO2 97%   BMI 21.44 kg/m²   Physical Exam  Vitals signs and nursing note reviewed. Constitutional:       General: She is not in acute distress. Appearance: She is well-developed. She is not ill-appearing. HENT:      Head: Normocephalic and atraumatic. Right Ear: External ear normal.      Left Ear: External ear normal.   Eyes:      General: No scleral icterus. Right eye: No discharge. Left eye: No discharge. Conjunctiva/sclera: Conjunctivae normal.      Pupils: Pupils are equal, round, and reactive to light. Neck:      Thyroid: No thyromegaly. Trachea: No tracheal deviation. Cardiovascular:      Rate and Rhythm: Normal rate and regular rhythm. Heart sounds: Normal heart sounds. Pulmonary:      Effort: Pulmonary effort is normal. No respiratory distress. Breath sounds: Normal breath sounds. No wheezing. Lymphadenopathy:      Cervical: No cervical adenopathy. Skin:     General: Skin is warm. Findings: No rash. Neurological:      Mental Status: She is alert and oriented to person, place, and time. Psychiatric:         Mood and Affect: Mood normal.         Behavior: Behavior normal.         Thought Content: Thought content normal.         Assessment:       Diagnosis Orders   1. Essential hypertension     2. Need for vaccination  INFLUENZA, QUADV, ADJUVANTED, 65 YRS =, IM, PF, PREFILL SYR, 0.5ML (FLUAD)        Plan:    Renew Actonel. Continue other medications as is. Flu shot today. Patient education Shingrix. Return in about 6 months (around 3/30/2021), or Medicare wellness.     Orders Placed This Encounter   Procedures    INFLUENZA, QUADV, ADJUVANTED, 72 YRS =, IM, PF, PREFILL SYR, 0.5ML (FLUAD)     Orders Placed This Encounter Medications    Risedronate Sodium (ACTONEL) 150 MG TABS     Sig: Take 150 mg by mouth every 30 days     Dispense:  4 tablet     Refill:  3       Patient given educationalmaterials - see patient instructions. Discussed use, benefit, and side effectsof prescribed medications. All patient questions answered. Pt voiced understanding. Reviewed health maintenance. Instructed to continue current medications, diet andexercise. Patient agreed with treatment plan. Follow up as directed.      Electronicallysigned by Ruben Wylie MD on 9/30/2020 at 1:26 PM

## 2020-10-02 ENCOUNTER — TELEPHONE (OUTPATIENT)
Dept: PRIMARY CARE CLINIC | Age: 65
End: 2020-10-02

## 2020-10-02 NOTE — TELEPHONE ENCOUNTER
Theresa Vera from [employer] calling to let you know that pt's Risedronate Sodium was approved from 9/1/2020 through 10/1/2021. FYI. I let pt know as well.

## 2021-04-13 ENCOUNTER — OFFICE VISIT (OUTPATIENT)
Dept: PRIMARY CARE CLINIC | Age: 66
End: 2021-04-13
Payer: MEDICARE

## 2021-04-13 VITALS
SYSTOLIC BLOOD PRESSURE: 126 MMHG | OXYGEN SATURATION: 97 % | HEIGHT: 68 IN | HEART RATE: 73 BPM | WEIGHT: 138.8 LBS | DIASTOLIC BLOOD PRESSURE: 74 MMHG | BODY MASS INDEX: 21.04 KG/M2

## 2021-04-13 DIAGNOSIS — Z00.00 ROUTINE GENERAL MEDICAL EXAMINATION AT A HEALTH CARE FACILITY: Primary | ICD-10-CM

## 2021-04-13 DIAGNOSIS — H90.3 SENSORINEURAL HEARING LOSS (SNHL) OF BOTH EARS: ICD-10-CM

## 2021-04-13 DIAGNOSIS — Z13.6 ENCOUNTER FOR LIPID SCREENING FOR CARDIOVASCULAR DISEASE: ICD-10-CM

## 2021-04-13 DIAGNOSIS — I10 ESSENTIAL HYPERTENSION: ICD-10-CM

## 2021-04-13 DIAGNOSIS — F42.9 OBSESSIVE-COMPULSIVE DISORDER, UNSPECIFIED TYPE: ICD-10-CM

## 2021-04-13 DIAGNOSIS — Z00.00 ANNUAL PHYSICAL EXAM: ICD-10-CM

## 2021-04-13 DIAGNOSIS — Z13.220 ENCOUNTER FOR LIPID SCREENING FOR CARDIOVASCULAR DISEASE: ICD-10-CM

## 2021-04-13 PROCEDURE — G0438 PPPS, INITIAL VISIT: HCPCS | Performed by: FAMILY MEDICINE

## 2021-04-13 PROCEDURE — 93000 ELECTROCARDIOGRAM COMPLETE: CPT | Performed by: FAMILY MEDICINE

## 2021-04-13 RX ORDER — LISINOPRIL 10 MG/1
10 TABLET ORAL DAILY
Qty: 90 TABLET | Refills: 3 | Status: SHIPPED | OUTPATIENT
Start: 2021-04-13 | End: 2022-05-13

## 2021-04-13 RX ORDER — BUSPIRONE HYDROCHLORIDE 15 MG/1
7.5 TABLET ORAL 2 TIMES DAILY
Qty: 90 TABLET | Refills: 3 | Status: SHIPPED | OUTPATIENT
Start: 2021-04-13 | End: 2022-05-19

## 2021-04-13 RX ORDER — FLUOXETINE HYDROCHLORIDE 20 MG/1
20 CAPSULE ORAL DAILY
Qty: 90 CAPSULE | Refills: 3 | Status: SHIPPED | OUTPATIENT
Start: 2021-04-13 | End: 2022-05-13

## 2021-04-13 RX ORDER — B-COMPLEX WITH VITAMIN C
1 TABLET ORAL
COMMUNITY

## 2021-04-13 SDOH — ECONOMIC STABILITY: FOOD INSECURITY: WITHIN THE PAST 12 MONTHS, THE FOOD YOU BOUGHT JUST DIDN'T LAST AND YOU DIDN'T HAVE MONEY TO GET MORE.: NEVER TRUE

## 2021-04-13 SDOH — ECONOMIC STABILITY: INCOME INSECURITY: HOW HARD IS IT FOR YOU TO PAY FOR THE VERY BASICS LIKE FOOD, HOUSING, MEDICAL CARE, AND HEATING?: NOT HARD AT ALL

## 2021-04-13 SDOH — ECONOMIC STABILITY: TRANSPORTATION INSECURITY
IN THE PAST 12 MONTHS, HAS THE LACK OF TRANSPORTATION KEPT YOU FROM MEDICAL APPOINTMENTS OR FROM GETTING MEDICATIONS?: NO

## 2021-04-13 ASSESSMENT — PATIENT HEALTH QUESTIONNAIRE - PHQ9
SUM OF ALL RESPONSES TO PHQ9 QUESTIONS 1 & 2: 0
SUM OF ALL RESPONSES TO PHQ QUESTIONS 1-9: 0

## 2021-04-13 NOTE — PROGRESS NOTES
Medicare Annual Wellness Visit  Name: Vito Arreguin Date: 2021   MRN: [id number] Sex: Female   Age: 76 y.o. Ethnicity: Non-/Non    : 1955 Race: Simmie Severin is here for Medicare AWV    Screenings for behavioral, psychosocial and functional/safety risks, and cognitive dysfunction are all negative except as indicated below. These results, as well as other patient data from the [address] [facility] form, are documented in Flowsheets linked to this Encounter. Allergies   Allergen Reactions    Percocet [Oxycodone-Acetaminophen] Other (See Comments)     hallucinations    Sulfa Antibiotics Rash       Prior to Visit Medications    Medication Sig Taking? Authorizing Provider   Calcium Carbonate-Vitamin D (OYSTER SHELL CALCIUM/D) 500-200 MG-UNIT TABS Take 1 tablet by mouth Yes Historical Provider, MD   lisinopril (PRINIVIL;ZESTRIL) 10 MG tablet TAKE 1 TABLET DAILY Yes Richard Craig MD   Risedronate Sodium (ACTONEL) 150 MG TABS Take 150 mg by mouth every 30 days Yes Richard Craig MD   FLUoxetine (PROZAC) 20 MG capsule Take 1 capsule by mouth daily Yes Richard Craig MD   triamcinolone (KENALOG) 0.1 % cream as needed Yes Historical Provider, MD   busPIRone (BUSPAR) 15 MG tablet 1/2 bid Yes Rcihard Craig MD   fluocinonide (LIDEX) 0.05 % cream Apply topically 2 times daily.  Yes Pedro Trujillo DO   ketoconazole (NIZORAL) 2 % shampoo apply topically to the affected areaone time daily as needed Yes Historical Provider, MD   SUMAtriptan (IMITREX) 100 MG tablet Take 1 tablet by mouth daily as needed for Migraine Yes Baltazar Ramires MD   Multiple Vitamins-Minerals (THERAPEUTIC MULTIVITAMIN-MINERALS) tablet Take 1 tablet by mouth daily Yes Historical Provider, MD       Past Medical History:   Diagnosis Date    Depression     OCD (obsessive compulsive disorder)        Past Surgical History:   Procedure Laterality Date    CERVICAL FUSION      324 fused together       Family History   Problem Relation Age of Onset    High Blood Pressure Father        CareTeam (Including outside providers/suppliers regularly involved in providing care):   Patient Care Team:  Chad Hawley MD as PCP - General (Family Medicine)  Chad Hawley MD as PCP - [facility] Provider    Wt Readings from Last 3 Encounters:   04/13/21 138 lb 12.8 oz (63 kg)   09/30/20 139 lb 3.2 oz (63.1 kg)   03/30/20 141 lb (64 kg)     Vitals:    04/13/21 1414   BP: 126/74   Pulse: 73   SpO2: 97%   Weight: 138 lb 12.8 oz (63 kg)   Height: 5' 7.56\" (1.716 m)     Body mass index is 21.38 kg/m². Based upon direct observation of the patient, evaluation of cognition reveals recent and remote memory intact.     General Appearance: alert and oriented to person, place and time, well developed and well- nourished, in no acute distress  Skin: warm and dry, no rash or erythema  Head: normocephalic and atraumatic  Eyes: pupils equal, round, and reactive to light, extraocular eye movements intact, conjunctivae normal  ENT: tympanic membrane, external ear and ear canal normal bilaterally, nose without deformity, nasal mucosa and turbinates normal without polyps  Neck: supple and non-tender without mass, no thyromegaly or thyroid nodules, no cervical lymphadenopathy  Pulmonary/Chest: clear to auscultation bilaterally- no wheezes, rales or rhonchi, normal air movement, no respiratory distress  Cardiovascular: normal rate, regular rhythm, normal S1 and S2, no murmurs, rubs, clicks, or gallops, distal pulses intact, no carotid bruits  Abdomen: soft, non-tender, non-distended, normal bowel sounds, no masses or organomegaly  Extremities: no cyanosis, clubbing or edema  Musculoskeletal: normal range of motion, no joint swelling, deformity or tenderness  Neurologic: reflexes normal and symmetric, no cranial nerve deficit, gait, coordination and speech normal    Patient's complete Health Risk Assessment and screening values have been reviewed and are found in Flowsheets. The following problems were reviewed today and where indicated follow up appointments were made and/or referrals ordered. Positive Risk Factor Screenings with Interventions:          General Health and ACP:  General  In general, how would you say your health is?: Very Good  In the past 7 days, have you experienced any of the following?  New or Increased Pain, New or Increased Fatigue, Loneliness, Social Isolation, Stress or Anger?: None of These  Do you get the social and emotional support that you need?: Yes  Do you have a Living Will?: Yes  Advance Directives     Power of  Living Will ACP-Advance Directive ACP-Power of     Not on File Not on File Not on File Not on File      General Health Risk Interventions:  · Has living will      Hearing/Vision:  No exam data present  Hearing/Vision  Do you or your family notice any trouble with your hearing that hasn't been managed with hearing aids?: (!) Yes  Do you have difficulty driving, watching TV, or doing any of your daily activities because of your eyesight?: No  Have you had an eye exam within the past year?: (!) No  Hearing/Vision Interventions:  · Hearing concerns:  patient declines any further evaluation/treatment for hearing issues      Personalized Preventive Plan   Current Health Maintenance Status  Immunization History   Administered Date(s) Administered    COVID-19, Accountable, PF, 30mcg/0.3mL 01/30/2021, 02/20/2021    Hepatitis A Adult (Havrix, Vaqta) 07/12/2019, 01/17/2020    Hepatitis B Adult (Engerix-B) 07/12/2019, 09/17/2019, 01/17/2020    Influenza 01/22/2013    Influenza Virus Vaccine 01/17/2014, 10/26/2017    Influenza, Quadv, IM, (6 mo and older Fluzone, Flulaval, Fluarix and 3 yrs and older Afluria) 11/03/2016    Influenza, Quadv, adjuvanted, 65 yrs +, IM, PF (Fluad) 09/30/2020    Influenza, Triv, inactivated, subunit, adjuvanted, IM (Fluad 65 yrs and older) 11/07/2019    Meningococcal MCV4O (Menveo) 11/29/2019    Pneumococcal Polysaccharide ([id number]) 01/17/2020    Tdap (Boostrix, Adacel) 05/03/2016    Typhoid Vi capsular polysaccharide (Typhim VI) 11/29/2019    Yellow Fever Vaccine, Alt Formulation (Stamaril) 11/29/2019    Zoster Live (Zostavax) 05/15/2017    Zoster Recombinant (Shingrix) 01/07/2021        Health Maintenance   Topic Date Due    Hepatitis C screen  Never done    Annual Wellness Visit (AWV)  Never done    Breast cancer screen  01/03/2021    Shingles Vaccine (3 of 3) 03/04/2021    Potassium monitoring  09/08/2021    Creatinine monitoring  09/08/2021    Lipid screen  03/23/2024    DTaP/Tdap/Td vaccine (2 - Td) 05/03/2026    Colon cancer screen colonoscopy  09/14/2028    Flu vaccine  Completed    Pneumococcal 65+ years Vaccine  Completed    COVID-19 Vaccine  Completed    Hepatitis A vaccine  Aged Out    Hepatitis B vaccine  Aged Out    Hib vaccine  Aged Out    Meningococcal (ACWY) vaccine  Aged Out     Recommendations for Scandid Due: see orders and patient instructions/AVS.  . Recommended screening schedule for the next 5-10 years is provided to the patient in written form: see Patient Instructions/AVS.    There are no diagnoses linked to this encounter.        Blood work ordered  Staff to get mammogram from [facility]

## 2021-04-13 NOTE — PATIENT INSTRUCTIONS
Personalized Preventive Plan for Sol Guerrero - 4/13/2021  Medicare offers a range of preventive health benefits. Some of the tests and screenings are paid in full while other may be subject to a deductible, co-insurance, and/or copay. Some of these benefits include a comprehensive review of your medical history including lifestyle, illnesses that may run in your family, and various assessments and screenings as appropriate. After reviewing your medical record and screening and assessments performed today your provider may have ordered immunizations, labs, imaging, and/or referrals for you. A list of these orders (if applicable) as well as your Preventive Care list are included within your After Visit Summary for your review. Other Preventive Recommendations:    · A preventive eye exam performed by an eye specialist is recommended every 1-2 years to screen for glaucoma; cataracts, macular degeneration, and other eye disorders. · A preventive dental visit is recommended every 6 months. · Try to get at least 150 minutes of exercise per week or 10,000 steps per day on a pedometer . · Order or download the FREE \"Exercise & Physical Activity: Your Everyday Guide\" from [employer]. Call 2-970.859.2957 or search [employer] online. · You need 0794-2366 mg of calcium and 9640-3980 IU of vitamin D per day. It is possible to meet your calcium requirement with diet alone, but a vitamin D supplement is usually necessary to meet this goal.  · When exposed to the sun, use a sunscreen that protects against both UVA and UVB radiation with an SPF of 30 or greater. Reapply every 2 to 3 hours or after sweating, drying off with a towel, or swimming. · Always wear a seat belt when traveling in a car. Always wear a helmet when riding a bicycle or motorcycle.

## 2021-06-03 LAB
CHOLESTEROL, FASTING: 198
HDLC SERPL-MCNC: 82 MG/DL (ref 35–70)
LDL CHOLESTEROL CALCULATED: 99 MG/DL (ref 0–160)
TRIGLYCERIDE, FASTING: 85

## 2021-06-15 DIAGNOSIS — Z13.6 ENCOUNTER FOR LIPID SCREENING FOR CARDIOVASCULAR DISEASE: ICD-10-CM

## 2021-06-15 DIAGNOSIS — Z00.00 ANNUAL PHYSICAL EXAM: ICD-10-CM

## 2021-06-15 DIAGNOSIS — Z13.220 ENCOUNTER FOR LIPID SCREENING FOR CARDIOVASCULAR DISEASE: ICD-10-CM

## 2022-03-16 ENCOUNTER — OFFICE VISIT (OUTPATIENT)
Dept: PRIMARY CARE CLINIC | Age: 67
End: 2022-03-16
Payer: MEDICARE

## 2022-03-16 VITALS
HEIGHT: 68 IN | BODY MASS INDEX: 21.64 KG/M2 | SYSTOLIC BLOOD PRESSURE: 118 MMHG | HEART RATE: 62 BPM | OXYGEN SATURATION: 98 % | WEIGHT: 142.8 LBS | DIASTOLIC BLOOD PRESSURE: 62 MMHG

## 2022-03-16 DIAGNOSIS — H00.011 HORDEOLUM EXTERNUM OF RIGHT UPPER EYELID: Primary | ICD-10-CM

## 2022-03-16 PROCEDURE — 99213 OFFICE O/P EST LOW 20 MIN: CPT | Performed by: PHYSICIAN ASSISTANT

## 2022-03-16 RX ORDER — CIPROFLOXACIN HYDROCHLORIDE 3.5 MG/ML
1 SOLUTION/ DROPS TOPICAL
Qty: 10 ML | Refills: 0 | Status: SHIPPED | OUTPATIENT
Start: 2022-03-16 | End: 2022-03-26

## 2022-03-16 ASSESSMENT — ENCOUNTER SYMPTOMS
VOMITING: 0
EYE DISCHARGE: 0
SINUS PRESSURE: 0
PHOTOPHOBIA: 0
SORE THROAT: 0
COUGH: 0
SHORTNESS OF BREATH: 0
RHINORRHEA: 0
DIARRHEA: 0
ABDOMINAL DISTENTION: 0
CHEST TIGHTNESS: 0
ABDOMINAL PAIN: 0
CONSTIPATION: 0

## 2022-03-16 NOTE — PROGRESS NOTES
[address] [facility] PRIMARY CARE  [address] [address]  [address] [address]  Dept: [phone]    Cresencio Walker is a 78 y.o. female Established patient, who presents today for her medical conditions/complaints as noted below. Chief Complaint   Patient presents with    Stye     Right eye        HPI:     HPI: The patient is having stye for the last 3 weeks. It is on right eye. No vision changes. Reviewed prior notes None  Reviewed previous Labs    LDL Calculated (mg/dL)   Date Value   2021 99   2019 91   2016 100       (goal LDL is <100)   AST (U/L)   Date Value   2019 12     ALT (U/L)   Date Value   2019 9     BUN (mg/dL)   Date Value   2019 17     BP Readings from Last 3 Encounters:   22 118/62   21 126/74   20 130/70          (goal 120/80)    Past Medical History:   Diagnosis Date    Depression     OCD (obsessive compulsive disorder)       Past Surgical History:   Procedure Laterality Date    CERVICAL FUSION      3456 fused together       Family History   Problem Relation Age of Onset    High Blood Pressure Father        Social History     Tobacco Use    Smoking status: Former Smoker     Packs/day: 2.00     Years: 18.00     Pack years: 36.00     Types: Cigarettes     Quit date: 1997     Years since quittin.2    Smokeless tobacco: Never Used   Substance Use Topics    Alcohol use:  Yes     Alcohol/week: 1.7 standard drinks     Types: 2 Standard drinks or equivalent per week      Current Outpatient Medications   Medication Sig Dispense Refill    ciprofloxacin (CILOXAN) 0.3 % ophthalmic solution Place 1 drop into both eyes every 2 hours for 10 days 10 mL 0    Calcium Carbonate-Vitamin D (OYSTER SHELL CALCIUM/D) 500-200 MG-UNIT TABS Take 1 tablet by mouth      lisinopril (PRINIVIL;ZESTRIL) 10 MG tablet Take 1 tablet by mouth daily 90 tablet 3    busPIRone (BUSPAR) 15 MG tablet Take 7.5 mg by mouth 2 times daily 90 tablet 3    FLUoxetine (PROZAC) 20 MG capsule Take 1 capsule by mouth daily 90 capsule 3    Risedronate Sodium (ACTONEL) 150 MG TABS Take 150 mg by mouth every 30 days 4 tablet 3    triamcinolone (KENALOG) 0.1 % cream as needed      fluocinonide (LIDEX) 0.05 % cream Apply topically 2 times daily. 30 g 1    ketoconazole (NIZORAL) 2 % shampoo apply topically to the affected areaone time daily as needed  5    SUMAtriptan (IMITREX) 100 MG tablet Take 1 tablet by mouth daily as needed for Migraine 30 tablet 3    Multiple Vitamins-Minerals (THERAPEUTIC MULTIVITAMIN-MINERALS) tablet Take 1 tablet by mouth daily       No current facility-administered medications for this visit. Allergies   Allergen Reactions    Percocet [Oxycodone-Acetaminophen] Other (See Comments)     hallucinations    Sulfa Antibiotics Rash       Health Maintenance   Topic Date Due    Hepatitis C screen  Never done    Depression Monitoring  04/13/2022    Potassium monitoring  06/03/2022    Creatinine monitoring  06/03/2022    Breast cancer screen  10/28/2022    DTaP/Tdap/Td vaccine (2 - Td or Tdap) 05/03/2026    Lipid screen  06/03/2026    Colorectal Cancer Screen  09/14/2028    Flu vaccine  Completed    Shingles Vaccine  Completed    Pneumococcal 65+ years Vaccine  Completed    COVID-19 Vaccine  Completed    Hepatitis A vaccine  Aged Out    Hepatitis B vaccine  Aged Out    Hib vaccine  Aged Out    Meningococcal (ACWY) vaccine  Aged Out       Subjective:      Review of Systems   Constitutional: Negative for chills, fever and unexpected weight change. HENT: Negative for congestion, hearing loss, rhinorrhea, sinus pressure and sore throat. Eyes: Negative for photophobia, discharge and visual disturbance. Stye   Respiratory: Negative for cough, chest tightness and shortness of breath. Cardiovascular: Negative for chest pain, palpitations and leg swelling.    Gastrointestinal: Negative for abdominal distention, abdominal pain, constipation, diarrhea and vomiting. Endocrine: Negative for polydipsia and polyuria. Genitourinary: Negative for decreased urine volume, difficulty urinating, frequency and urgency. Musculoskeletal: Negative for arthralgias, gait problem and myalgias. Skin: Negative for rash. Allergic/Immunologic: Negative for food allergies. Neurological: Negative for dizziness, weakness, numbness and headaches. Hematological: Negative for adenopathy. Psychiatric/Behavioral: Negative for dysphoric mood and sleep disturbance. The patient is not nervous/anxious. Objective:     /62   Pulse 62   Ht 5' 7.56\" (1.716 m)   Wt 142 lb 12.8 oz (64.8 kg)   SpO2 98%   BMI 22.00 kg/m²   Physical Exam  Constitutional:       General: She is not in acute distress. Appearance: Normal appearance. She is not ill-appearing. HENT:      Head: Normocephalic and atraumatic. Right Ear: External ear normal.      Left Ear: External ear normal.      Nose: Nose normal.      Mouth/Throat:      Mouth: Mucous membranes are moist.   Eyes:      Extraocular Movements: Extraocular movements intact. Conjunctiva/sclera: Conjunctivae normal.      Pupils: Pupils are equal, round, and reactive to light. Neck:      Vascular: No carotid bruit. Cardiovascular:      Rate and Rhythm: Normal rate and regular rhythm. Pulses: Normal pulses. Heart sounds: Normal heart sounds. Pulmonary:      Effort: Pulmonary effort is normal. No respiratory distress. Breath sounds: Normal breath sounds. Abdominal:      General: Bowel sounds are normal. There is no distension. Tenderness: There is no abdominal tenderness. Musculoskeletal:         General: Normal range of motion. Cervical back: Normal range of motion and neck supple. Lymphadenopathy:      Cervical: No cervical adenopathy. Skin:     General: Skin is warm and dry. Neurological:      General: No focal deficit present. Mental Status: She is alert and oriented to person, place, and time. Psychiatric:         Mood and Affect: Mood normal.         Behavior: Behavior normal.         Thought Content: Thought content normal.         Assessment and Plan:          1. Hordeolum externum of right upper eyelid  -     ciprofloxacin (CILOXAN) 0.3 % ophthalmic solution; Place 1 drop into both eyes every 2 hours for 10 days, Disp-10 mL, R-0Normal             Patient given educational materials - see patient instructions. Discussed use, benefit, and side effects of prescribed medications. All patient questions answered. Pt voiced understanding. Reviewed health maintenance. Instructed to continue current medications, diet and exercise. Patient agreed with treatment plan. Follow up as directed.      Electronically signed by MIROSLAVA Curtis on 3/16/2022 at 9:21 AM

## 2022-05-13 DIAGNOSIS — I10 ESSENTIAL HYPERTENSION: ICD-10-CM

## 2022-05-13 RX ORDER — LISINOPRIL 10 MG/1
TABLET ORAL
Qty: 90 TABLET | Refills: 3 | Status: SHIPPED | OUTPATIENT
Start: 2022-05-13

## 2022-05-13 RX ORDER — FLUOXETINE HYDROCHLORIDE 20 MG/1
CAPSULE ORAL
Qty: 90 CAPSULE | Refills: 3 | Status: SHIPPED | OUTPATIENT
Start: 2022-05-13

## 2022-05-19 DIAGNOSIS — F42.9 OBSESSIVE-COMPULSIVE DISORDER, UNSPECIFIED TYPE: ICD-10-CM

## 2022-05-19 RX ORDER — BUSPIRONE HYDROCHLORIDE 15 MG/1
TABLET ORAL
Qty: 90 TABLET | Refills: 3 | Status: SHIPPED | OUTPATIENT
Start: 2022-05-19

## 2022-05-25 ENCOUNTER — OFFICE VISIT (OUTPATIENT)
Dept: PRIMARY CARE CLINIC | Age: 67
End: 2022-05-25
Payer: MEDICARE

## 2022-05-25 VITALS
DIASTOLIC BLOOD PRESSURE: 78 MMHG | HEIGHT: 68 IN | HEART RATE: 68 BPM | OXYGEN SATURATION: 98 % | SYSTOLIC BLOOD PRESSURE: 120 MMHG | BODY MASS INDEX: 21.4 KG/M2 | WEIGHT: 141.2 LBS

## 2022-05-25 DIAGNOSIS — Z23 NEED FOR VIRAL IMMUNIZATION: ICD-10-CM

## 2022-05-25 DIAGNOSIS — Z00.00 ANNUAL PHYSICAL EXAM: ICD-10-CM

## 2022-05-25 DIAGNOSIS — Z13.220 ENCOUNTER FOR LIPID SCREENING FOR CARDIOVASCULAR DISEASE: ICD-10-CM

## 2022-05-25 DIAGNOSIS — Z13.6 ENCOUNTER FOR LIPID SCREENING FOR CARDIOVASCULAR DISEASE: ICD-10-CM

## 2022-05-25 DIAGNOSIS — Z00.00 MEDICARE ANNUAL WELLNESS VISIT, SUBSEQUENT: Primary | ICD-10-CM

## 2022-05-25 PROCEDURE — G0439 PPPS, SUBSEQ VISIT: HCPCS | Performed by: FAMILY MEDICINE

## 2022-05-25 PROCEDURE — 1123F ACP DISCUSS/DSCN MKR DOCD: CPT | Performed by: FAMILY MEDICINE

## 2022-05-25 SDOH — ECONOMIC STABILITY: FOOD INSECURITY: WITHIN THE PAST 12 MONTHS, YOU WORRIED THAT YOUR FOOD WOULD RUN OUT BEFORE YOU GOT MONEY TO BUY MORE.: NEVER TRUE

## 2022-05-25 SDOH — ECONOMIC STABILITY: FOOD INSECURITY: WITHIN THE PAST 12 MONTHS, THE FOOD YOU BOUGHT JUST DIDN'T LAST AND YOU DIDN'T HAVE MONEY TO GET MORE.: NEVER TRUE

## 2022-05-25 ASSESSMENT — PATIENT HEALTH QUESTIONNAIRE - PHQ9
SUM OF ALL RESPONSES TO PHQ QUESTIONS 1-9: 0
4. FEELING TIRED OR HAVING LITTLE ENERGY: 0
10. IF YOU CHECKED OFF ANY PROBLEMS, HOW DIFFICULT HAVE THESE PROBLEMS MADE IT FOR YOU TO DO YOUR WORK, TAKE CARE OF THINGS AT HOME, OR GET ALONG WITH OTHER PEOPLE: 0
SUM OF ALL RESPONSES TO PHQ QUESTIONS 1-9: 0
6. FEELING BAD ABOUT YOURSELF - OR THAT YOU ARE A FAILURE OR HAVE LET YOURSELF OR YOUR FAMILY DOWN: 0
SUM OF ALL RESPONSES TO PHQ QUESTIONS 1-9: 0
7. TROUBLE CONCENTRATING ON THINGS, SUCH AS READING THE NEWSPAPER OR WATCHING TELEVISION: 0
1. LITTLE INTEREST OR PLEASURE IN DOING THINGS: 0
SUM OF ALL RESPONSES TO PHQ9 QUESTIONS 1 & 2: 0
SUM OF ALL RESPONSES TO PHQ QUESTIONS 1-9: 0
2. FEELING DOWN, DEPRESSED OR HOPELESS: 0
9. THOUGHTS THAT YOU WOULD BE BETTER OFF DEAD, OR OF HURTING YOURSELF: 0
8. MOVING OR SPEAKING SO SLOWLY THAT OTHER PEOPLE COULD HAVE NOTICED. OR THE OPPOSITE, BEING SO FIGETY OR RESTLESS THAT YOU HAVE BEEN MOVING AROUND A LOT MORE THAN USUAL: 0
3. TROUBLE FALLING OR STAYING ASLEEP: 0
5. POOR APPETITE OR OVEREATING: 0

## 2022-05-25 ASSESSMENT — LIFESTYLE VARIABLES
HOW MANY STANDARD DRINKS CONTAINING ALCOHOL DO YOU HAVE ON A TYPICAL DAY: 1 OR 2
HOW OFTEN DO YOU HAVE A DRINK CONTAINING ALCOHOL: MONTHLY OR LESS

## 2022-05-25 ASSESSMENT — SOCIAL DETERMINANTS OF HEALTH (SDOH): HOW HARD IS IT FOR YOU TO PAY FOR THE VERY BASICS LIKE FOOD, HOUSING, MEDICAL CARE, AND HEATING?: NOT HARD AT ALL

## 2022-05-25 NOTE — PATIENT INSTRUCTIONS
Personalized Preventive Plan for Mimi Almanza - 5/25/2022  Medicare offers a range of preventive health benefits. Some of the tests and screenings are paid in full while other may be subject to a deductible, co-insurance, and/or copay. Some of these benefits include a comprehensive review of your medical history including lifestyle, illnesses that may run in your family, and various assessments and screenings as appropriate. After reviewing your medical record and screening and assessments performed today your provider may have ordered immunizations, labs, imaging, and/or referrals for you. A list of these orders (if applicable) as well as your Preventive Care list are included within your After Visit Summary for your review. Other Preventive Recommendations:    · A preventive eye exam performed by an eye specialist is recommended every 1-2 years to screen for glaucoma; cataracts, macular degeneration, and other eye disorders. · A preventive dental visit is recommended every 6 months. · Try to get at least 150 minutes of exercise per week or 10,000 steps per day on a pedometer . · Order or download the FREE \"Exercise & Physical Activity: Your Everyday Guide\" from [employer]. Call 4-222.749.2581 or search [employer] online. · You need 4847-2623 mg of calcium and 5298-6835 IU of vitamin D per day. It is possible to meet your calcium requirement with diet alone, but a vitamin D supplement is usually necessary to meet this goal.  · When exposed to the sun, use a sunscreen that protects against both UVA and UVB radiation with an SPF of 30 or greater. Reapply every 2 to 3 hours or after sweating, drying off with a towel, or swimming. · Always wear a seat belt when traveling in a car. Always wear a helmet when riding a bicycle or motorcycle.

## 2022-05-25 NOTE — PROGRESS NOTES
Medicare Annual Wellness Visit    Bari Bowens is here for Medicare AWV    Assessment & Plan   Medicare annual wellness visit, subsequent  Annual physical exam  -     Basic Metabolic Panel, Fasting; Future  -     Hepatic Function Panel; Future  Encounter for lipid screening for cardiovascular disease  -     Lipid, Fasting; Future  Need for viral immunization  -     Pneumococcal, PREVNAR 20, PCV20, (age 25 yrs+), IM, PF      Recommendations for Preventive Services Due: see orders and patient instructions/AVS.  Recommended screening schedule for the next 5-10 years is provided to the patient in written form: see Patient Instructions/AVS.     Return in 6 months (on 11/25/2022) for Medicare Annual Wellness Visit in 1 year. Subjective   The following acute and/or chronic problems were also addressed today:  Depression with anxiety  Hypertension  Headaches     Patient's complete Health Risk Assessment and screening values have been reviewed and are found in Flowsheets. The following problems were reviewed today and where indicated follow up appointments were made and/or referrals ordered.     Positive Risk Factor Screenings with Interventions:               General Health and ACP:  General  In general, how would you say your health is?: Excellent  In the past 7 days, have you experienced any of the following: New or Increased Pain, New or Increased Fatigue, Loneliness, Social Isolation, Stress or Anger?: (!) Yes  Select all that apply: Verona Mcdonald  Do you get the social and emotional support that you need?: Yes  Do you have a Living Will?: Yes    Advance Directives     Power of  Living Will ACP-Advance Directive ACP-Power of     Not on File Not on File Not on File Not on File      General Health Risk Interventions:  · Has living will     Health Habits/Nutrition:     Physical Activity: Inactive    Days of Exercise per Week: 0 days    Minutes of Exercise per Session: 0 min     Have you lost any weight without trying in the past 3 months?: No  Body mass index: 21.75  Have you seen the dentist within the past year?: Yes    Health Habits/Nutrition Interventions:  · Up to date              Objective   Vitals:    05/25/22 1119   BP: 120/78   Pulse: 68   SpO2: 98%   Weight: 141 lb 3.2 oz (64 kg)   Height: 5' 7.56\" (1.716 m)      Body mass index is 21.75 kg/m². General Appearance: alert and oriented to person, place and time, well developed and well- nourished, in no acute distress  Skin: warm and dry, no rash or erythema  Head: normocephalic and atraumatic  Eyes: pupils equal, round, and reactive to light, extraocular eye movements intact, conjunctivae normal  ENT: tympanic membrane, external ear and ear canal normal bilaterally, nose without deformity, nasal mucosa and turbinates normal without polyps  Neck: supple and non-tender without mass, no thyromegaly or thyroid nodules, no cervical lymphadenopathy  Pulmonary/Chest: clear to auscultation bilaterally- no wheezes, rales or rhonchi, normal air movement, no respiratory distress  Cardiovascular: normal rate, regular rhythm, normal S1 and S2, no murmurs, rubs, clicks, or gallops, distal pulses intact, no carotid bruits  Abdomen: soft, non-tender, non-distended, normal bowel sounds, no masses or organomegaly  Extremities: no cyanosis, clubbing or edema  Musculoskeletal: normal range of motion, no joint swelling, deformity or tenderness  Neurologic: reflexes normal and symmetric, no cranial nerve deficit, gait, coordination and speech normal       Allergies   Allergen Reactions    Percocet [Oxycodone-Acetaminophen] Other (See Comments)     hallucinations    Sulfa Antibiotics Rash     Prior to Visit Medications    Medication Sig Taking?  Authorizing Provider   busPIRone (BUSPAR) 15 MG tablet TAKE ONE-HALF (1/2) TABLET (7.5 MG) TWICE A DAY Yes Anders Hudson MD   lisinopril (PRINIVIL;ZESTRIL) 10 MG tablet TAKE 1 TABLET DAILY Yes Anders Hudson MD FLUoxetine (PROZAC) 20 MG capsule TAKE 1 CAPSULE DAILY Yes Viral Regalado MD   Calcium Carbonate-Vitamin D (OYSTER SHELL CALCIUM/D) 500-200 MG-UNIT TABS Take 1 tablet by mouth Yes Historical Provider, MD   triamcinolone (KENALOG) 0.1 % cream as needed Yes Historical Provider, MD   fluocinonide (LIDEX) 0.05 % cream Apply topically 2 times daily. Yes Shan Sigalaugh,    ketoconazole (NIZORAL) 2 % shampoo apply topically to the affected areaone time daily as needed Yes Historical Provider, MD   SUMAtriptan (IMITREX) 100 MG tablet Take 1 tablet by mouth daily as needed for Migraine Yes Ronel Akhtar MD   Multiple Vitamins-Minerals (THERAPEUTIC MULTIVITAMIN-MINERALS) tablet Take 1 tablet by mouth daily Yes Historical Provider, MD Thompson (Including outside providers/suppliers regularly involved in providing care):   Patient Care Team:  Viral Regalado MD as PCP - General (Family Medicine)  Viral Regalado MD as PCP - [facility] Empaneled Provider     Reviewed and updated this visit:  Tobacco  Allergies  Meds  Problems  Med Hx  Surg Hx  Soc Hx  Fam Hx              Work ordered  Continue present medications  Patient to try exercise and counseling.   If depression does not improve, would increase Prozac to 20 mg twice daily  Prevnar 20 when available

## 2022-05-27 ENCOUNTER — TELEPHONE (OUTPATIENT)
Dept: PRIMARY CARE CLINIC | Age: 67
End: 2022-05-27

## 2022-06-07 ENCOUNTER — NURSE ONLY (OUTPATIENT)
Dept: PRIMARY CARE CLINIC | Age: 67
End: 2022-06-07
Payer: MEDICARE

## 2022-06-07 VITALS
SYSTOLIC BLOOD PRESSURE: 114 MMHG | DIASTOLIC BLOOD PRESSURE: 70 MMHG | OXYGEN SATURATION: 97 % | HEART RATE: 86 BPM | BODY MASS INDEX: 21.38 KG/M2 | WEIGHT: 138.8 LBS

## 2022-06-07 DIAGNOSIS — Z23 NEED FOR VACCINATION: Primary | ICD-10-CM

## 2022-06-07 DIAGNOSIS — Z13.6 ENCOUNTER FOR LIPID SCREENING FOR CARDIOVASCULAR DISEASE: ICD-10-CM

## 2022-06-07 DIAGNOSIS — Z13.220 ENCOUNTER FOR LIPID SCREENING FOR CARDIOVASCULAR DISEASE: ICD-10-CM

## 2022-06-07 DIAGNOSIS — Z00.00 ANNUAL PHYSICAL EXAM: ICD-10-CM

## 2022-06-07 LAB
ALBUMIN SERPL-MCNC: 4.5 G/DL (ref 3.5–5.2)
ALBUMIN/GLOBULIN RATIO: 1.7 (ref 1–2.5)
ALP BLD-CCNC: 103 U/L (ref 35–104)
ALT SERPL-CCNC: 9 U/L (ref 5–33)
ANION GAP SERPL CALCULATED.3IONS-SCNC: 15 MMOL/L (ref 9–17)
AST SERPL-CCNC: 15 U/L
BILIRUB SERPL-MCNC: 0.48 MG/DL (ref 0.3–1.2)
BILIRUBIN DIRECT: 0.12 MG/DL
BILIRUBIN, INDIRECT: 0.36 MG/DL (ref 0–1)
BUN BLDV-MCNC: 13 MG/DL (ref 8–23)
BUN/CREAT BLD: NORMAL (ref 9–20)
CALCIUM SERPL-MCNC: 10.1 MG/DL (ref 8.6–10.4)
CHLORIDE BLD-SCNC: 106 MMOL/L (ref 98–107)
CHOLESTEROL, FASTING: 202 MG/DL
CHOLESTEROL/HDL RATIO: 2.5
CO2: 21 MMOL/L (ref 20–31)
CREAT SERPL-MCNC: 0.57 MG/DL (ref 0.5–0.9)
GFR AFRICAN AMERICAN: >60 ML/MIN
GFR NON-AFRICAN AMERICAN: >60 ML/MIN
GFR SERPL CREATININE-BSD FRML MDRD: NORMAL ML/MIN/{1.73_M2}
GFR SERPL CREATININE-BSD FRML MDRD: NORMAL ML/MIN/{1.73_M2}
GLOBULIN: NORMAL G/DL (ref 1.5–3.8)
GLUCOSE FASTING: 83 MG/DL (ref 70–99)
HDLC SERPL-MCNC: 81 MG/DL
LDL CHOLESTEROL: 105 MG/DL (ref 0–130)
POTASSIUM SERPL-SCNC: 4.6 MMOL/L (ref 3.7–5.3)
SODIUM BLD-SCNC: 142 MMOL/L (ref 135–144)
TOTAL PROTEIN: 7.1 G/DL (ref 6.4–8.3)
TRIGLYCERIDE, FASTING: 82 MG/DL
VLDLC SERPL CALC-MCNC: ABNORMAL MG/DL (ref 1–30)

## 2022-06-07 PROCEDURE — 90677 PCV20 VACCINE IM: CPT | Performed by: FAMILY MEDICINE

## 2022-06-07 PROCEDURE — 90471 IMMUNIZATION ADMIN: CPT | Performed by: FAMILY MEDICINE

## 2022-06-07 NOTE — PROGRESS NOTES
After obtaining consent, and per orders of Dr. Zaynab Ortiz, injection of Prevnar 20 given in Left deltoid by Kaya Pringle MA. Patient instructed to remain in clinic for 20 minutes afterwards, and to report any adverse reaction to me immediately.

## 2023-02-22 SDOH — ECONOMIC STABILITY: FOOD INSECURITY: WITHIN THE PAST 12 MONTHS, YOU WORRIED THAT YOUR FOOD WOULD RUN OUT BEFORE YOU GOT MONEY TO BUY MORE.: NEVER TRUE

## 2023-02-22 SDOH — ECONOMIC STABILITY: FOOD INSECURITY: WITHIN THE PAST 12 MONTHS, THE FOOD YOU BOUGHT JUST DIDN'T LAST AND YOU DIDN'T HAVE MONEY TO GET MORE.: NEVER TRUE

## 2023-02-22 SDOH — ECONOMIC STABILITY: TRANSPORTATION INSECURITY
IN THE PAST 12 MONTHS, HAS LACK OF TRANSPORTATION KEPT YOU FROM MEETINGS, WORK, OR FROM GETTING THINGS NEEDED FOR DAILY LIVING?: NO

## 2023-02-22 SDOH — ECONOMIC STABILITY: INCOME INSECURITY: HOW HARD IS IT FOR YOU TO PAY FOR THE VERY BASICS LIKE FOOD, HOUSING, MEDICAL CARE, AND HEATING?: NOT HARD AT ALL

## 2023-02-22 SDOH — ECONOMIC STABILITY: HOUSING INSECURITY
IN THE LAST 12 MONTHS, WAS THERE A TIME WHEN YOU DID NOT HAVE A STEADY PLACE TO SLEEP OR SLEPT IN A SHELTER (INCLUDING NOW)?: NO

## 2023-02-23 ENCOUNTER — OFFICE VISIT (OUTPATIENT)
Dept: PRIMARY CARE CLINIC | Age: 68
End: 2023-02-23
Payer: MEDICARE

## 2023-02-23 VITALS
DIASTOLIC BLOOD PRESSURE: 72 MMHG | SYSTOLIC BLOOD PRESSURE: 124 MMHG | WEIGHT: 141.2 LBS | BODY MASS INDEX: 21.4 KG/M2 | HEART RATE: 86 BPM | HEIGHT: 68 IN | OXYGEN SATURATION: 97 %

## 2023-02-23 DIAGNOSIS — Z23 NEED FOR VIRAL IMMUNIZATION: ICD-10-CM

## 2023-02-23 DIAGNOSIS — R53.83 OTHER FATIGUE: ICD-10-CM

## 2023-02-23 DIAGNOSIS — R53.83 FATIGUE, UNSPECIFIED TYPE: ICD-10-CM

## 2023-02-23 DIAGNOSIS — F42.9 OBSESSIVE-COMPULSIVE DISORDER, UNSPECIFIED TYPE: ICD-10-CM

## 2023-02-23 DIAGNOSIS — R79.89 ELEVATED TSH: Primary | ICD-10-CM

## 2023-02-23 RX ORDER — AZITHROMYCIN 250 MG/1
250 TABLET, FILM COATED ORAL SEE ADMIN INSTRUCTIONS
Qty: 6 TABLET | Refills: 0 | Status: SHIPPED | OUTPATIENT
Start: 2023-02-23 | End: 2023-02-28

## 2023-02-23 RX ORDER — FLUOXETINE HYDROCHLORIDE 20 MG/1
20 CAPSULE ORAL 2 TIMES DAILY
Qty: 180 CAPSULE | Refills: 3 | Status: SHIPPED | OUTPATIENT
Start: 2023-02-23

## 2023-02-23 ASSESSMENT — PATIENT HEALTH QUESTIONNAIRE - PHQ9
1. LITTLE INTEREST OR PLEASURE IN DOING THINGS: 0
SUM OF ALL RESPONSES TO PHQ QUESTIONS 1-9: 1
5. POOR APPETITE OR OVEREATING: 0
9. THOUGHTS THAT YOU WOULD BE BETTER OFF DEAD, OR OF HURTING YOURSELF: 0
SUM OF ALL RESPONSES TO PHQ QUESTIONS 1-9: 1
2. FEELING DOWN, DEPRESSED OR HOPELESS: 1
SUM OF ALL RESPONSES TO PHQ9 QUESTIONS 1 & 2: 1
7. TROUBLE CONCENTRATING ON THINGS, SUCH AS READING THE NEWSPAPER OR WATCHING TELEVISION: 0
6. FEELING BAD ABOUT YOURSELF - OR THAT YOU ARE A FAILURE OR HAVE LET YOURSELF OR YOUR FAMILY DOWN: 0
4. FEELING TIRED OR HAVING LITTLE ENERGY: 0
8. MOVING OR SPEAKING SO SLOWLY THAT OTHER PEOPLE COULD HAVE NOTICED. OR THE OPPOSITE, BEING SO FIGETY OR RESTLESS THAT YOU HAVE BEEN MOVING AROUND A LOT MORE THAN USUAL: 0
3. TROUBLE FALLING OR STAYING ASLEEP: 0
SUM OF ALL RESPONSES TO PHQ QUESTIONS 1-9: 1
SUM OF ALL RESPONSES TO PHQ QUESTIONS 1-9: 1

## 2023-02-23 ASSESSMENT — ENCOUNTER SYMPTOMS
ABDOMINAL PAIN: 0
COUGH: 0
NAUSEA: 0
RHINORRHEA: 0
EYE DISCHARGE: 0
VOMITING: 0
WHEEZING: 0
EYE REDNESS: 0
SHORTNESS OF BREATH: 0
SORE THROAT: 0
DIARRHEA: 0

## 2023-02-23 NOTE — PROGRESS NOTES
[facility] CARE  [address] [address]  Dept: [phone]    Joan Denny is a 75 y.o. female Established patient, who presents today for her medical conditions/complaints as noted below. Chief Complaint   Patient presents with    Depression       HPI:     HPI  Pt states going to [facility] in 3 weeks. No chest pain or sob. No fever or chills. No light headed or dizziness. Patient had blood work done by the [facility]. Showed a slightly decrease in TSH but a normal free T4. Patient denies any weight loss. Denies any night sweats fevers or chills. Patient feels that her depression has been acting up. Wondering if she needs adjustment in her medications. States has poor vision and drive to want to do things. Denies any thoughts of hurting self or others. Patient will be going to [facility] in 3 weeks on vacation. Guidelines were reviewed and a one-time polio booster was recommended.     Reviewed prior notes None  Reviewed previous Labs    LDL Cholesterol (mg/dL)   Date Value   06/07/2022 105     LDL Calculated (mg/dL)   Date Value   06/03/2021 99   03/23/2019 91   04/23/2016 100       (goal LDL is <100)   AST (U/L)   Date Value   06/07/2022 15     ALT (U/L)   Date Value   06/07/2022 9     BUN (mg/dL)   Date Value   06/07/2022 13     BP Readings from Last 3 Encounters:   02/23/23 124/72   06/07/22 114/70   05/25/22 120/78          (goal 120/80)    Past Medical History:   Diagnosis Date    Depression     OCD (obsessive compulsive disorder)       Past Surgical History:   Procedure Laterality Date    CERVICAL FUSION  2018 3456 fused together    CHOLECYSTECTOMY, LAPAROSCOPIC      2011       Family History   Problem Relation Age of Onset    High Blood Pressure Father        Social History     Tobacco Use    Smoking status: Former     Packs/day: 2.00     Years: 18.00     Pack years: 36.00     Types: Cigarettes     Quit date: 1/1/1997     Years since quittin.1    Smokeless tobacco: Never   Substance Use Topics    Alcohol use: Yes     Alcohol/week: 5.0 standard drinks     Types: 5 Glasses of wine per week      Current Outpatient Medications   Medication Sig Dispense Refill    FLUoxetine (PROZAC) 20 MG capsule Take 1 capsule by mouth 2 times daily 180 capsule 3    azithromycin (ZITHROMAX) 250 MG tablet Take 1 tablet by mouth See Admin Instructions for 5 days 500mg on day 1 followed by 250mg on days 2 - 5 6 tablet 0    busPIRone (BUSPAR) 15 MG tablet TAKE ONE-HALF (1/2) TABLET (7.5 MG) TWICE A DAY 90 tablet 3    lisinopril (PRINIVIL;ZESTRIL) 10 MG tablet TAKE 1 TABLET DAILY 90 tablet 3    Calcium Carbonate-Vitamin D (OYSTER SHELL CALCIUM/D) 500-200 MG-UNIT TABS Take 1 tablet by mouth      triamcinolone (KENALOG) 0.1 % cream as needed      fluocinonide (LIDEX) 0.05 % cream Apply topically 2 times daily. 30 g 1    ketoconazole (NIZORAL) 2 % shampoo apply topically to the affected areaone time daily as needed  5    SUMAtriptan (IMITREX) 100 MG tablet Take 1 tablet by mouth daily as needed for Migraine 30 tablet 3    Multiple Vitamins-Minerals (THERAPEUTIC MULTIVITAMIN-MINERALS) tablet Take 1 tablet by mouth daily       No current facility-administered medications for this visit.      Allergies   Allergen Reactions    Percocet [Oxycodone-Acetaminophen] Other (See Comments)     hallucinations    Sulfa Antibiotics Rash       Health Maintenance   Topic Date Due    Hepatitis C screen  Never done    Flu vaccine (1) 2022    Breast cancer screen  10/28/2022    Depression Monitoring  2023    Annual Wellness Visit (AWV)  2023    DTaP/Tdap/Td vaccine (2 - Td or Tdap) 2026    Lipids  2027    Colorectal Cancer Screen  2028    DEXA (modify frequency per FRAX score)  Completed    Shingles vaccine  Completed    Pneumococcal 65+ years Vaccine  Completed    COVID-19 Vaccine  Completed    Hepatitis A vaccine  Aged Out    Hib vaccine  Aged Out Meningococcal (ACWY) vaccine  Aged Out       Subjective:      Review of Systems   Constitutional:  Negative for chills and fever. HENT:  Negative for rhinorrhea and sore throat. Eyes:  Negative for discharge and redness. Respiratory:  Negative for cough, shortness of breath and wheezing. Cardiovascular:  Negative for chest pain and palpitations. Gastrointestinal:  Negative for abdominal pain, diarrhea, nausea and vomiting. Genitourinary:  Negative for dysuria and frequency. Musculoskeletal:  Negative for arthralgias and myalgias. Neurological:  Negative for dizziness, light-headedness and headaches. Psychiatric/Behavioral:  Negative for sleep disturbance. Objective:     /72   Pulse 86   Ht 5' 7.56\" (1.716 m)   Wt 141 lb 3.2 oz (64 kg)   SpO2 97%   BMI 21.75 kg/m²   Physical Exam  Vitals and nursing note reviewed. Constitutional:       General: She is not in acute distress. Appearance: She is well-developed. She is not ill-appearing. HENT:      Head: Normocephalic and atraumatic. Right Ear: External ear normal.      Left Ear: External ear normal.   Eyes:      General: No scleral icterus. Right eye: No discharge. Left eye: No discharge. Conjunctiva/sclera: Conjunctivae normal.   Neck:      Thyroid: No thyromegaly. Trachea: No tracheal deviation. Cardiovascular:      Rate and Rhythm: Normal rate and regular rhythm. Heart sounds: Normal heart sounds. Pulmonary:      Effort: Pulmonary effort is normal. No respiratory distress. Breath sounds: Normal breath sounds. No wheezing. Lymphadenopathy:      Cervical: No cervical adenopathy. Skin:     General: Skin is warm. Findings: No rash. Neurological:      Mental Status: She is alert and oriented to person, place, and time. Psychiatric:         Mood and Affect: Mood normal.         Behavior: Behavior normal.         Thought Content:  Thought content normal.       Assessment: Diagnosis Orders   1. Elevated TSH  TSH    T4, Free      2. Fatigue, unspecified type        3. Obsessive-compulsive disorder, unspecified type        4. Other fatigue   TSH    T4, Free      5. Need for viral immunization  Poliovirus, IPOL, (age 10 wks+), SC/IM           Plan:   Repeat thyroid blood work in 4 weeks. Advised patient believe this is just a statistical variation since her free T4 is normal and she is not on thyroid medication. She shows no signs of hyperthyroidism  Polio booster today for travel to [facility]  Increase Prozac to 40 mg daily  If mood is doing much better, advised she can start to wean off of the BuSpar    Return if symptoms worsen or fail to improve. Orders Placed This Encounter   Procedures    Poliovirus, IPOL, (age 10 wks+), SC/IM    TSH     Standing Status:   Future     Standing Expiration Date:   2/23/2024    T4, Free     Standing Status:   Future     Standing Expiration Date:   2/23/2024     Orders Placed This Encounter   Medications    FLUoxetine (PROZAC) 20 MG capsule     Sig: Take 1 capsule by mouth 2 times daily     Dispense:  180 capsule     Refill:  3    azithromycin (ZITHROMAX) 250 MG tablet     Sig: Take 1 tablet by mouth See Admin Instructions for 5 days 500mg on day 1 followed by 250mg on days 2 - 5     Dispense:  6 tablet     Refill:  0       Patient given educational materials - see patient instructions. Discussed use, benefit, and side effects of prescribed medications. All patient questions answered. Pt voiced understanding. Reviewed health maintenance. Instructed to continue current medications, diet andexercise. Patient agreed with treatment plan. Follow up as directed.      Electronicallysigned by Jesus Robertson MD on 2/23/2023 at 1:57 PM

## 2023-02-27 ENCOUNTER — TELEPHONE (OUTPATIENT)
Dept: PRIMARY CARE CLINIC | Age: 68
End: 2023-02-27

## 2023-02-27 RX ORDER — FLUOXETINE HYDROCHLORIDE 20 MG/1
20 CAPSULE ORAL 2 TIMES DAILY
Qty: 60 CAPSULE | Refills: 11 | Status: SHIPPED | OUTPATIENT
Start: 2023-02-27

## 2023-02-27 NOTE — TELEPHONE ENCOUNTER
Pt asking for you to send to [facility],rx only # 60 of the proczac with refills. Ins will cover one month, not 90 day supply. YOUSUF Genao listed.

## 2023-04-18 LAB
T4 FREE: NORMAL
TSH SERPL DL<=0.05 MIU/L-ACNC: NORMAL M[IU]/L

## 2023-04-19 DIAGNOSIS — R79.89 ELEVATED TSH: ICD-10-CM

## 2023-04-19 DIAGNOSIS — R53.83 OTHER FATIGUE: ICD-10-CM

## 2023-06-01 DIAGNOSIS — I10 ESSENTIAL HYPERTENSION: ICD-10-CM

## 2023-06-01 NOTE — TELEPHONE ENCOUNTER
LAST VISIT:   2/23/2023     Future Appointments   Date Time Provider Bashir Shay   6/19/2023  2:30 PM MD [facility] Melanie Forrester

## 2023-06-02 RX ORDER — LISINOPRIL 10 MG/1
10 TABLET ORAL DAILY
Qty: 90 TABLET | Refills: 3 | Status: SHIPPED | OUTPATIENT
Start: 2023-06-02

## 2023-06-16 SDOH — HEALTH STABILITY: PHYSICAL HEALTH: ON AVERAGE, HOW MANY MINUTES DO YOU ENGAGE IN EXERCISE AT THIS LEVEL?: 30 MIN

## 2023-06-16 SDOH — HEALTH STABILITY: PHYSICAL HEALTH: ON AVERAGE, HOW MANY DAYS PER WEEK DO YOU ENGAGE IN MODERATE TO STRENUOUS EXERCISE (LIKE A BRISK WALK)?: 3 DAYS

## 2023-06-16 ASSESSMENT — PATIENT HEALTH QUESTIONNAIRE - PHQ9
9. THOUGHTS THAT YOU WOULD BE BETTER OFF DEAD, OR OF HURTING YOURSELF: 0
SUM OF ALL RESPONSES TO PHQ QUESTIONS 1-9: 2
1. LITTLE INTEREST OR PLEASURE IN DOING THINGS: 1
SUM OF ALL RESPONSES TO PHQ QUESTIONS 1-9: 2
3. TROUBLE FALLING OR STAYING ASLEEP: 0
SUM OF ALL RESPONSES TO PHQ QUESTIONS 1-9: 2
7. TROUBLE CONCENTRATING ON THINGS, SUCH AS READING THE NEWSPAPER OR WATCHING TELEVISION: 0
4. FEELING TIRED OR HAVING LITTLE ENERGY: 0
5. POOR APPETITE OR OVEREATING: 0
SUM OF ALL RESPONSES TO PHQ9 QUESTIONS 1 & 2: 2
SUM OF ALL RESPONSES TO PHQ QUESTIONS 1-9: 2
8. MOVING OR SPEAKING SO SLOWLY THAT OTHER PEOPLE COULD HAVE NOTICED. OR THE OPPOSITE, BEING SO FIGETY OR RESTLESS THAT YOU HAVE BEEN MOVING AROUND A LOT MORE THAN USUAL: 0
2. FEELING DOWN, DEPRESSED OR HOPELESS: 1
6. FEELING BAD ABOUT YOURSELF - OR THAT YOU ARE A FAILURE OR HAVE LET YOURSELF OR YOUR FAMILY DOWN: 0

## 2023-06-16 ASSESSMENT — LIFESTYLE VARIABLES
HOW OFTEN DO YOU HAVE SIX OR MORE DRINKS ON ONE OCCASION: 1
HOW OFTEN DO YOU HAVE A DRINK CONTAINING ALCOHOL: 3
HOW MANY STANDARD DRINKS CONTAINING ALCOHOL DO YOU HAVE ON A TYPICAL DAY: 1
HOW OFTEN DO YOU HAVE A DRINK CONTAINING ALCOHOL: 2-4 TIMES A MONTH
HOW MANY STANDARD DRINKS CONTAINING ALCOHOL DO YOU HAVE ON A TYPICAL DAY: 1 OR 2

## 2023-06-19 ENCOUNTER — OFFICE VISIT (OUTPATIENT)
Dept: PRIMARY CARE CLINIC | Age: 68
End: 2023-06-19
Payer: MEDICARE

## 2023-06-19 VITALS
SYSTOLIC BLOOD PRESSURE: 122 MMHG | DIASTOLIC BLOOD PRESSURE: 74 MMHG | HEART RATE: 70 BPM | WEIGHT: 134.4 LBS | HEIGHT: 68 IN | OXYGEN SATURATION: 98 % | BODY MASS INDEX: 20.37 KG/M2

## 2023-06-19 DIAGNOSIS — Z00.00 MEDICARE ANNUAL WELLNESS VISIT, SUBSEQUENT: Primary | ICD-10-CM

## 2023-06-19 DIAGNOSIS — Z13.6 ENCOUNTER FOR LIPID SCREENING FOR CARDIOVASCULAR DISEASE: ICD-10-CM

## 2023-06-19 DIAGNOSIS — Z00.00 ANNUAL PHYSICAL EXAM: ICD-10-CM

## 2023-06-19 DIAGNOSIS — Z13.220 ENCOUNTER FOR LIPID SCREENING FOR CARDIOVASCULAR DISEASE: ICD-10-CM

## 2023-06-19 PROCEDURE — 3074F SYST BP LT 130 MM HG: CPT | Performed by: FAMILY MEDICINE

## 2023-06-19 PROCEDURE — G0439 PPPS, SUBSEQ VISIT: HCPCS | Performed by: FAMILY MEDICINE

## 2023-06-19 PROCEDURE — 3078F DIAST BP <80 MM HG: CPT | Performed by: FAMILY MEDICINE

## 2023-06-19 PROCEDURE — 1123F ACP DISCUSS/DSCN MKR DOCD: CPT | Performed by: FAMILY MEDICINE

## 2023-06-19 PROCEDURE — 3017F COLORECTAL CA SCREEN DOC REV: CPT | Performed by: FAMILY MEDICINE

## 2023-06-19 RX ORDER — RISEDRONATE SODIUM 150 MG/1
TABLET, FILM COATED ORAL
COMMUNITY
Start: 2023-04-20

## 2023-06-19 NOTE — PROGRESS NOTES
Medicare Annual Wellness Visit    Primo Daniels is here for Medicare AWV    Assessment & Plan   Encounter for lipid screening for cardiovascular disease  -     Lipid, Fasting; Future  Annual physical exam  -     Basic Metabolic Panel, Fasting; Future  -     Hepatic Function Panel; Future    Recommendations for Preventive Services Due: see orders and patient instructions/AVS.  Recommended screening schedule for the next 5-10 years is provided to the patient in written form: see Patient Instructions/AVS.     Return in about 1 year (around 6/19/2024) for PHYSICAL EXAM.     Subjective   The following acute and/or chronic problems were also addressed today:  Depression with anxiety  Hypertension     Patient's complete Health Risk Assessment and screening values have been reviewed and are found in Flowsheets. The following problems were reviewed today and where indicated follow up appointments were made and/or referrals ordered. Positive Risk Factor Screenings with Interventions:    Fall Risk:  Do you feel unsteady or are you worried about falling? : no  2 or more falls in past year?: (!) yes  Fall with injury in past year?: (!) yes     Interventions:    Patient declines any further evaluation or treatment                  Safety:  Do you have either shower bars, grab bars, non-slip mats or non-slip surfaces in your shower or bathtub?: (!) No  Interventions:  Patient declined any further interventions or treatment                       Objective   Vitals:    06/19/23 1429   BP: 122/74   Pulse: 70   SpO2: 98%   Weight: 134 lb 6.4 oz (61 kg)   Height: 5' 7.56\" (1.716 m)      Body mass index is 20.7 kg/m².     General Appearance: alert and oriented to person, place and time, well developed and well- nourished, in no acute distress  Skin: warm and dry, no rash or erythema  Head: normocephalic and atraumatic  Eyes: pupils equal, round, and reactive to light, extraocular eye movements intact, conjunctivae normal  ENT:

## 2023-07-07 LAB
A/G RATIO: NORMAL
ALBUMIN SERPL-MCNC: NORMAL G/DL
ALP BLD-CCNC: NORMAL U/L
ALT SERPL-CCNC: NORMAL U/L
ANION GAP SERPL CALCULATED.3IONS-SCNC: NORMAL MMOL/L
AST SERPL-CCNC: NORMAL U/L
BILIRUB SERPL-MCNC: NORMAL MG/DL
BILIRUBIN DIRECT: NORMAL
BILIRUBIN, INDIRECT: NORMAL
BUN BLDV-MCNC: NORMAL MG/DL
BUN/CREAT BLD: NORMAL
CALCIUM SERPL-MCNC: NORMAL MG/DL
CHLORIDE BLD-SCNC: NORMAL MMOL/L
CHOLESTEROL, FASTING: 203
CO2: NORMAL
CREAT SERPL-MCNC: NORMAL MG/DL
GFR AFRICAN AMERICAN: NORMAL
GFR NON-AFRICAN AMERICAN: NORMAL
GLOBULIN: NORMAL
GLUCOSE FASTING: 79 MG/DL
HDLC SERPL-MCNC: 81 MG/DL (ref 35–70)
LDL CHOLESTEROL CALCULATED: 107 MG/DL (ref 0–160)
POTASSIUM SERPL-SCNC: NORMAL MMOL/L
PROTEIN TOTAL: NORMAL
SODIUM BLD-SCNC: NORMAL MMOL/L
TRIGLYCERIDE, FASTING: 75

## 2023-07-10 DIAGNOSIS — Z00.00 ANNUAL PHYSICAL EXAM: ICD-10-CM

## 2023-07-10 DIAGNOSIS — Z13.220 ENCOUNTER FOR LIPID SCREENING FOR CARDIOVASCULAR DISEASE: ICD-10-CM

## 2023-07-10 DIAGNOSIS — Z13.6 ENCOUNTER FOR LIPID SCREENING FOR CARDIOVASCULAR DISEASE: ICD-10-CM

## 2023-09-07 ENCOUNTER — TELEPHONE (OUTPATIENT)
Dept: PRIMARY CARE CLINIC | Age: 68
End: 2023-09-07

## 2023-09-07 RX ORDER — AZITHROMYCIN 250 MG/1
250 TABLET, FILM COATED ORAL SEE ADMIN INSTRUCTIONS
Qty: 6 TABLET | Refills: 0 | Status: SHIPPED | OUTPATIENT
Start: 2023-09-07 | End: 2023-09-12

## 2023-09-07 NOTE — TELEPHONE ENCOUNTER
Pt states [facility] is going [facility] [place] and wants to know what shots and meds she may need. Also would like a zpak sent in for her.  [facility]

## 2023-09-07 NOTE — TELEPHONE ENCOUNTER
Patient has already had hepatitis A and Shingrix. Up-to-date on her Boostrix. No other immunizations recommended.     ALISHA-Rob sent

## 2023-10-25 ENCOUNTER — TELEPHONE (OUTPATIENT)
Dept: PRIMARY CARE CLINIC | Age: 68
End: 2023-10-25

## 2023-10-25 RX ORDER — BUSPIRONE HYDROCHLORIDE 7.5 MG/1
7.5 TABLET ORAL 2 TIMES DAILY
Qty: 60 TABLET | Refills: 0 | Status: SHIPPED | OUTPATIENT
Start: 2023-10-25 | End: 2023-11-24

## 2023-10-25 NOTE — TELEPHONE ENCOUNTER
Per patient due to some life changes she does not feel that the Prozac is working is much as she like. Per patient she had been on Buspar in the past which she thinks helped quite a bit. Patient is asking if she can switch back. Per patient she does have the 15 mg on hand from her previous script with directions to take . 5 tab twice daily. Please advise.

## 2023-11-29 ENCOUNTER — OFFICE VISIT (OUTPATIENT)
Dept: PRIMARY CARE CLINIC | Age: 68
End: 2023-11-29
Payer: MEDICARE

## 2023-11-29 VITALS
WEIGHT: 137 LBS | DIASTOLIC BLOOD PRESSURE: 70 MMHG | HEIGHT: 68 IN | BODY MASS INDEX: 20.76 KG/M2 | HEART RATE: 86 BPM | OXYGEN SATURATION: 97 % | SYSTOLIC BLOOD PRESSURE: 112 MMHG

## 2023-11-29 DIAGNOSIS — H00.014 HORDEOLUM EXTERNUM LEFT UPPER EYELID: Primary | ICD-10-CM

## 2023-11-29 PROCEDURE — 3078F DIAST BP <80 MM HG: CPT | Performed by: STUDENT IN AN ORGANIZED HEALTH CARE EDUCATION/TRAINING PROGRAM

## 2023-11-29 PROCEDURE — 1036F TOBACCO NON-USER: CPT | Performed by: STUDENT IN AN ORGANIZED HEALTH CARE EDUCATION/TRAINING PROGRAM

## 2023-11-29 PROCEDURE — G8399 PT W/DXA RESULTS DOCUMENT: HCPCS | Performed by: STUDENT IN AN ORGANIZED HEALTH CARE EDUCATION/TRAINING PROGRAM

## 2023-11-29 PROCEDURE — 99212 OFFICE O/P EST SF 10 MIN: CPT | Performed by: STUDENT IN AN ORGANIZED HEALTH CARE EDUCATION/TRAINING PROGRAM

## 2023-11-29 PROCEDURE — G8420 CALC BMI NORM PARAMETERS: HCPCS | Performed by: STUDENT IN AN ORGANIZED HEALTH CARE EDUCATION/TRAINING PROGRAM

## 2023-11-29 PROCEDURE — 1123F ACP DISCUSS/DSCN MKR DOCD: CPT | Performed by: STUDENT IN AN ORGANIZED HEALTH CARE EDUCATION/TRAINING PROGRAM

## 2023-11-29 PROCEDURE — G8427 DOCREV CUR MEDS BY ELIG CLIN: HCPCS | Performed by: STUDENT IN AN ORGANIZED HEALTH CARE EDUCATION/TRAINING PROGRAM

## 2023-11-29 PROCEDURE — 3074F SYST BP LT 130 MM HG: CPT | Performed by: STUDENT IN AN ORGANIZED HEALTH CARE EDUCATION/TRAINING PROGRAM

## 2023-11-29 PROCEDURE — G8484 FLU IMMUNIZE NO ADMIN: HCPCS | Performed by: STUDENT IN AN ORGANIZED HEALTH CARE EDUCATION/TRAINING PROGRAM

## 2023-11-29 PROCEDURE — 1090F PRES/ABSN URINE INCON ASSESS: CPT | Performed by: STUDENT IN AN ORGANIZED HEALTH CARE EDUCATION/TRAINING PROGRAM

## 2023-11-29 PROCEDURE — 3017F COLORECTAL CA SCREEN DOC REV: CPT | Performed by: STUDENT IN AN ORGANIZED HEALTH CARE EDUCATION/TRAINING PROGRAM

## 2023-11-29 ASSESSMENT — ENCOUNTER SYMPTOMS
EYE PAIN: 1
SHORTNESS OF BREATH: 0
EYE REDNESS: 0

## 2023-11-29 NOTE — PROGRESS NOTES
[address] PRIMARY CARE  [place] [address]  Dept: [phone]    Demarcus Heredia is a 70 y.o. female Established patient, who presents acutely for her complaints as noted below:    Chief Complaint   Patient presents with    Stye     Possible stye on left eyelid, patient noticed it on Sat. Patient said its sore, itchy red and swollen. Patient has been trying warm compressed, and OTC Stye ointment        HPI:     As noted above. Has had styes before. This one is not responding to her management. Reviewed prior notes from PCP. Reviewed previous labs. LDL Cholesterol (mg/dL)   Date Value   2022 105     LDL Calculated (mg/dL)   Date Value   2023 107   2021 99   2019 91       (goal LDL is <100)   AST (U/L)   Date Value   2022 15     ALT (U/L)   Date Value   2022 9     BUN (mg/dL)   Date Value   2022 13     BP Readings from Last 3 Encounters:   23 112/70   23 122/74   23 124/72          (goal 120/80)    Past Medical History:   Diagnosis Date    Depression     OCD (obsessive compulsive disorder)       Past Surgical History:   Procedure Laterality Date    CERVICAL FUSION  2018 fused together    CHOLECYSTECTOMY, LAPAROSCOPIC             Family History   Problem Relation Age of Onset    High Blood Pressure Father        Social History     Tobacco Use    Smoking status: Former     Packs/day: 2.00     Years: 18.00     Additional pack years: 0.00     Total pack years: 36.00     Types: Cigarettes     Quit date: 1997     Years since quittin.9    Smokeless tobacco: Never   Substance Use Topics    Alcohol use:  Yes     Alcohol/week: 5.0 standard drinks of alcohol     Types: 5 Glasses of wine per week      Current Outpatient Medications   Medication Sig Dispense Refill    Risedronate Sodium 150 MG TABS       lisinopril (PRINIVIL;ZESTRIL) 10 MG tablet Take 1 tablet by mouth daily 90 tablet

## 2024-01-09 ENCOUNTER — TELEPHONE (OUTPATIENT)
Dept: PRIMARY CARE CLINIC | Age: 69
End: 2024-01-09

## 2024-01-09 NOTE — TELEPHONE ENCOUNTER
Patient fell 2 days ago, has wrist pain and would like to come in today to be seen.  has no availability. Patient advised Dr.Pierce KONG will cll back with a time with him or different provider.    Please call Julisa fay at [phone]

## 2024-01-10 ENCOUNTER — OFFICE VISIT (OUTPATIENT)
Dept: PRIMARY CARE CLINIC | Age: 69
End: 2024-01-10
Payer: MEDICARE

## 2024-01-10 VITALS
SYSTOLIC BLOOD PRESSURE: 114 MMHG | HEART RATE: 75 BPM | OXYGEN SATURATION: 95 % | DIASTOLIC BLOOD PRESSURE: 78 MMHG | HEIGHT: 67 IN | WEIGHT: 135.6 LBS | BODY MASS INDEX: 21.28 KG/M2

## 2024-01-10 DIAGNOSIS — S69.91XA INJURY OF RIGHT WRIST, INITIAL ENCOUNTER: ICD-10-CM

## 2024-01-10 DIAGNOSIS — S69.91XA INJURY OF RIGHT WRIST, INITIAL ENCOUNTER: Primary | ICD-10-CM

## 2024-01-10 PROCEDURE — 1090F PRES/ABSN URINE INCON ASSESS: CPT | Performed by: STUDENT IN AN ORGANIZED HEALTH CARE EDUCATION/TRAINING PROGRAM

## 2024-01-10 PROCEDURE — 1036F TOBACCO NON-USER: CPT | Performed by: STUDENT IN AN ORGANIZED HEALTH CARE EDUCATION/TRAINING PROGRAM

## 2024-01-10 PROCEDURE — 3074F SYST BP LT 130 MM HG: CPT | Performed by: STUDENT IN AN ORGANIZED HEALTH CARE EDUCATION/TRAINING PROGRAM

## 2024-01-10 PROCEDURE — 99212 OFFICE O/P EST SF 10 MIN: CPT | Performed by: STUDENT IN AN ORGANIZED HEALTH CARE EDUCATION/TRAINING PROGRAM

## 2024-01-10 PROCEDURE — G8484 FLU IMMUNIZE NO ADMIN: HCPCS | Performed by: STUDENT IN AN ORGANIZED HEALTH CARE EDUCATION/TRAINING PROGRAM

## 2024-01-10 PROCEDURE — 1123F ACP DISCUSS/DSCN MKR DOCD: CPT | Performed by: STUDENT IN AN ORGANIZED HEALTH CARE EDUCATION/TRAINING PROGRAM

## 2024-01-10 PROCEDURE — G8399 PT W/DXA RESULTS DOCUMENT: HCPCS | Performed by: STUDENT IN AN ORGANIZED HEALTH CARE EDUCATION/TRAINING PROGRAM

## 2024-01-10 PROCEDURE — G8420 CALC BMI NORM PARAMETERS: HCPCS | Performed by: STUDENT IN AN ORGANIZED HEALTH CARE EDUCATION/TRAINING PROGRAM

## 2024-01-10 PROCEDURE — 3078F DIAST BP <80 MM HG: CPT | Performed by: STUDENT IN AN ORGANIZED HEALTH CARE EDUCATION/TRAINING PROGRAM

## 2024-01-10 PROCEDURE — 3017F COLORECTAL CA SCREEN DOC REV: CPT | Performed by: STUDENT IN AN ORGANIZED HEALTH CARE EDUCATION/TRAINING PROGRAM

## 2024-01-10 PROCEDURE — G8427 DOCREV CUR MEDS BY ELIG CLIN: HCPCS | Performed by: STUDENT IN AN ORGANIZED HEALTH CARE EDUCATION/TRAINING PROGRAM

## 2024-01-10 ASSESSMENT — PATIENT HEALTH QUESTIONNAIRE - PHQ9
SUM OF ALL RESPONSES TO PHQ QUESTIONS 1-9: 4
8. MOVING OR SPEAKING SO SLOWLY THAT OTHER PEOPLE COULD HAVE NOTICED. OR THE OPPOSITE - BEING SO FIDGETY OR RESTLESS THAT YOU HAVE BEEN MOVING AROUND A LOT MORE THAN USUAL: NOT AT ALL
7. TROUBLE CONCENTRATING ON THINGS, SUCH AS READING THE NEWSPAPER OR WATCHING TELEVISION: NOT AT ALL
1. LITTLE INTEREST OR PLEASURE IN DOING THINGS: 1
5. POOR APPETITE OR OVEREATING: NOT AT ALL
SUM OF ALL RESPONSES TO PHQ9 QUESTIONS 1 & 2: 2
6. FEELING BAD ABOUT YOURSELF - OR THAT YOU ARE A FAILURE OR HAVE LET YOURSELF OR YOUR FAMILY DOWN: 0
6. FEELING BAD ABOUT YOURSELF - OR THAT YOU ARE A FAILURE OR HAVE LET YOURSELF OR YOUR FAMILY DOWN: NOT AT ALL
5. POOR APPETITE OR OVEREATING: 0
2. FEELING DOWN, DEPRESSED OR HOPELESS: SEVERAL DAYS
4. FEELING TIRED OR HAVING LITTLE ENERGY: 1
SUM OF ALL RESPONSES TO PHQ QUESTIONS 1-9: 4
SUM OF ALL RESPONSES TO PHQ QUESTIONS 1-9: 4
2. FEELING DOWN, DEPRESSED OR HOPELESS: 1
7. TROUBLE CONCENTRATING ON THINGS, SUCH AS READING THE NEWSPAPER OR WATCHING TELEVISION: 0
3. TROUBLE FALLING OR STAYING ASLEEP: SEVERAL DAYS
SUM OF ALL RESPONSES TO PHQ QUESTIONS 1-9: 4
1. LITTLE INTEREST OR PLEASURE IN DOING THINGS: SEVERAL DAYS
10. IF YOU CHECKED OFF ANY PROBLEMS, HOW DIFFICULT HAVE THESE PROBLEMS MADE IT FOR YOU TO DO YOUR WORK, TAKE CARE OF THINGS AT HOME, OR GET ALONG WITH OTHER PEOPLE: 1
9. THOUGHTS THAT YOU WOULD BE BETTER OFF DEAD, OR OF HURTING YOURSELF: 0
4. FEELING TIRED OR HAVING LITTLE ENERGY: SEVERAL DAYS
9. THOUGHTS THAT YOU WOULD BE BETTER OFF DEAD, OR OF HURTING YOURSELF: NOT AT ALL
8. MOVING OR SPEAKING SO SLOWLY THAT OTHER PEOPLE COULD HAVE NOTICED. OR THE OPPOSITE, BEING SO FIGETY OR RESTLESS THAT YOU HAVE BEEN MOVING AROUND A LOT MORE THAN USUAL: 0
10. IF YOU CHECKED OFF ANY PROBLEMS, HOW DIFFICULT HAVE THESE PROBLEMS MADE IT FOR YOU TO DO YOUR WORK, TAKE CARE OF THINGS AT HOME, OR GET ALONG WITH OTHER PEOPLE: SOMEWHAT DIFFICULT
3. TROUBLE FALLING OR STAYING ASLEEP: 1
SUM OF ALL RESPONSES TO PHQ QUESTIONS 1-9: 4

## 2024-01-10 ASSESSMENT — ENCOUNTER SYMPTOMS: SHORTNESS OF BREATH: 0

## 2024-01-10 NOTE — PROGRESS NOTES
[facility]  [facility] PRIMARY CARE  [address] [facility] [address]  Dept: [phone]    Julisa Mcmillan is a 68 y.o. female Established patient, who presents acutely for her complaints as noted below:    Chief Complaint   Patient presents with    Wrist Pain     Right wrist pain, patient fell 1 week ago.        HPI:     [place] in [place], walking downhill and hit her face and right wrist. Does not recall the mechanism of injury due to how quickly it occurred. Pain starts at wrist and goes up the arm, feels like a burning, \"stretching\" kind of pain.    Reviewed prior notes from PCP.  Reviewed previous labs.    LDL Cholesterol (mg/dL)   Date Value   2022 105     LDL Calculated (mg/dL)   Date Value   2023 107   2021 99   2019 91       (goal LDL is <100)   AST (U/L)   Date Value   2022 15     ALT (U/L)   Date Value   2022 9     BUN (mg/dL)   Date Value   2022 13     BP Readings from Last 3 Encounters:   01/10/24 114/78   23 112/70   23 122/74          (goal 120/80)    Past Medical History:   Diagnosis Date    Depression     OCD (obsessive compulsive disorder)       Past Surgical History:   Procedure Laterality Date    CERVICAL FUSION      345 fused together    CHOLECYSTECTOMY, LAPAROSCOPIC             Family History   Problem Relation Age of Onset    High Blood Pressure Father        Social History     Tobacco Use    Smoking status: Former     Current packs/day: 0.00     Average packs/day: 2.0 packs/day for 18.0 years (36.0 ttl pk-yrs)     Types: Cigarettes     Start date: 1979     Quit date: 1997     Years since quittin.0    Smokeless tobacco: Never   Substance Use Topics    Alcohol use: Yes     Alcohol/week: 5.0 standard drinks of alcohol     Types: 5 Glasses of wine per week      Current Outpatient Medications   Medication Sig Dispense Refill    Risedronate Sodium 150 MG TABS

## 2024-01-12 ENCOUNTER — OFFICE VISIT (OUTPATIENT)
Dept: ORTHOPEDIC SURGERY | Age: 69
End: 2024-01-12
Payer: MEDICARE

## 2024-01-12 VITALS — HEIGHT: 67 IN | RESPIRATION RATE: 15 BRPM | WEIGHT: 136 LBS | BODY MASS INDEX: 21.35 KG/M2

## 2024-01-12 DIAGNOSIS — S52.571A OTHER CLOSED INTRA-ARTICULAR FRACTURE OF DISTAL END OF RIGHT RADIUS, INITIAL ENCOUNTER: Primary | ICD-10-CM

## 2024-01-12 PROCEDURE — G8420 CALC BMI NORM PARAMETERS: HCPCS | Performed by: PHYSICIAN ASSISTANT

## 2024-01-12 PROCEDURE — 1123F ACP DISCUSS/DSCN MKR DOCD: CPT | Performed by: PHYSICIAN ASSISTANT

## 2024-01-12 PROCEDURE — G8427 DOCREV CUR MEDS BY ELIG CLIN: HCPCS | Performed by: PHYSICIAN ASSISTANT

## 2024-01-12 PROCEDURE — G8399 PT W/DXA RESULTS DOCUMENT: HCPCS | Performed by: PHYSICIAN ASSISTANT

## 2024-01-12 PROCEDURE — G8484 FLU IMMUNIZE NO ADMIN: HCPCS | Performed by: PHYSICIAN ASSISTANT

## 2024-01-12 PROCEDURE — 1036F TOBACCO NON-USER: CPT | Performed by: PHYSICIAN ASSISTANT

## 2024-01-12 PROCEDURE — 99203 OFFICE O/P NEW LOW 30 MIN: CPT | Performed by: PHYSICIAN ASSISTANT

## 2024-01-12 PROCEDURE — 3017F COLORECTAL CA SCREEN DOC REV: CPT | Performed by: PHYSICIAN ASSISTANT

## 2024-01-12 PROCEDURE — 1090F PRES/ABSN URINE INCON ASSESS: CPT | Performed by: PHYSICIAN ASSISTANT

## 2024-01-12 SDOH — HEALTH STABILITY: PHYSICAL HEALTH: ON AVERAGE, HOW MANY DAYS PER WEEK DO YOU ENGAGE IN MODERATE TO STRENUOUS EXERCISE (LIKE A BRISK WALK)?: 2 DAYS

## 2024-01-12 SDOH — HEALTH STABILITY: PHYSICAL HEALTH: ON AVERAGE, HOW MANY MINUTES DO YOU ENGAGE IN EXERCISE AT THIS LEVEL?: 40 MIN

## 2024-01-12 ASSESSMENT — ENCOUNTER SYMPTOMS
VOMITING: 0
COUGH: 0
COLOR CHANGE: 0
SHORTNESS OF BREATH: 0

## 2024-01-12 NOTE — PROGRESS NOTES
Kayy  I have reviewed your recent labs. Here are the results:    -FIT test (screening test for colon cancer) was normal. ADVISE - rechecking in 1 year.    .  If you have any questions please do not hesitate to contact our office via phone ([phone]) or MyChart.    Bridgett Lora, MS, PA-C  [facility]  
WRIST RT MIN 3 VWS    4 views right wrist are obtained.  There is a nondisplaced distal radial metaphyseal fracture.  Suspect intra-articular extension.  Scaphoid appears intact.  Radiocarpal joint unremarkable.    IMPRESSION:    Nondisplaced distal radial fracture with probable intra-articular extension.       ASSESSMENT:     1. Other closed intra-articular fracture of distal end of right radius, initial encounter         PLAN:     Julisa Mcmillan is a 68 y.o. female here today for right distal radius fracture sustained on 1/3/2024 after falling while hiking in [place].  I personally reviewed the patient's recent x-rays revealing relatively non-displaced right distal radius fracture with intra-articular involvement.     The patient was placed in a long right wrist brace for support and stabilization of her right wrist. She was instructed not to removed the brace unless she is bathing/showering. She notes that she will be out of town next week in [place], therefore she is to follow up in 11 days at our [place] location with repeat x-rays of the right wrist out of brace.     We discussed that she should not lift more than a pen or a fork with the right hand. She should continue to take OTC tylenol or Ibuprofen as needed for pain. She can removed the brace to ice her wrist if needed.     The patient will follow up in 11 days with x-rays of the right wrist out of brace, or sooner if needed. We discussed that the patient should call us with any questions or concerns.  The patient voiced her understanding.       Return in about 11 days (around 1/23/2024) for re-evaluation.    Total Time: 30 min      No orders of the defined types were placed in this encounter.      No orders of the defined types were placed in this encounter.      This note is created with the assistance of a speech recognition program.  While intending to generate a document that actually reflects the content of the visit, the document can still

## 2024-01-23 ENCOUNTER — OFFICE VISIT (OUTPATIENT)
Dept: ORTHOPEDIC SURGERY | Age: 69
End: 2024-01-23
Payer: MEDICARE

## 2024-01-23 VITALS — WEIGHT: 136 LBS | BODY MASS INDEX: 21.35 KG/M2 | HEIGHT: 67 IN | RESPIRATION RATE: 14 BRPM

## 2024-01-23 DIAGNOSIS — S52.571D OTHER CLOSED INTRA-ARTICULAR FRACTURE OF DISTAL END OF RIGHT RADIUS WITH ROUTINE HEALING, SUBSEQUENT ENCOUNTER: Primary | ICD-10-CM

## 2024-01-23 PROCEDURE — G8420 CALC BMI NORM PARAMETERS: HCPCS | Performed by: PHYSICIAN ASSISTANT

## 2024-01-23 PROCEDURE — 99214 OFFICE O/P EST MOD 30 MIN: CPT | Performed by: PHYSICIAN ASSISTANT

## 2024-01-23 PROCEDURE — G8399 PT W/DXA RESULTS DOCUMENT: HCPCS | Performed by: PHYSICIAN ASSISTANT

## 2024-01-23 PROCEDURE — 1123F ACP DISCUSS/DSCN MKR DOCD: CPT | Performed by: PHYSICIAN ASSISTANT

## 2024-01-23 PROCEDURE — G8427 DOCREV CUR MEDS BY ELIG CLIN: HCPCS | Performed by: PHYSICIAN ASSISTANT

## 2024-01-23 PROCEDURE — 3017F COLORECTAL CA SCREEN DOC REV: CPT | Performed by: PHYSICIAN ASSISTANT

## 2024-01-23 PROCEDURE — 1036F TOBACCO NON-USER: CPT | Performed by: PHYSICIAN ASSISTANT

## 2024-01-23 PROCEDURE — G8484 FLU IMMUNIZE NO ADMIN: HCPCS | Performed by: PHYSICIAN ASSISTANT

## 2024-01-23 PROCEDURE — 1090F PRES/ABSN URINE INCON ASSESS: CPT | Performed by: PHYSICIAN ASSISTANT

## 2024-01-23 ASSESSMENT — ENCOUNTER SYMPTOMS
COLOR CHANGE: 0
VOMITING: 0
SHORTNESS OF BREATH: 0
COUGH: 0

## 2024-01-23 NOTE — PROGRESS NOTES
weeks (around 2/20/2024) for re-evaluation.    Total Time: 30 min      No orders of the defined types were placed in this encounter.      Orders Placed This Encounter   Procedures    XR WRIST RIGHT (MIN 3 VIEWS)     Standing Status:   Future     Number of Occurrences:   1     Standing Expiration Date:   1/22/2025       This note is created with the assistance of a speech recognition program.  While intending to generate a document that actually reflects the content of the visit, the document can still have some errors including those of syntax and sound a like substitutions which may escape proof reading.  In such instances, actual meaning can be extrapolated by contextual diversion.     Electronically signed by Marianne Gray PA-C on 1/23/2024 at 12:41 PM

## 2024-02-09 RX ORDER — FLUOXETINE HYDROCHLORIDE 40 MG/1
40 CAPSULE ORAL DAILY
Qty: 90 CAPSULE | Refills: 3 | Status: SHIPPED | OUTPATIENT
Start: 2024-02-09

## 2024-02-20 ENCOUNTER — OFFICE VISIT (OUTPATIENT)
Dept: ORTHOPEDIC SURGERY | Age: 69
End: 2024-02-20
Payer: MEDICARE

## 2024-02-20 VITALS — WEIGHT: 136 LBS | HEIGHT: 67 IN | BODY MASS INDEX: 21.35 KG/M2

## 2024-02-20 DIAGNOSIS — S52.571D OTHER CLOSED INTRA-ARTICULAR FRACTURE OF DISTAL END OF RIGHT RADIUS WITH ROUTINE HEALING, SUBSEQUENT ENCOUNTER: Primary | ICD-10-CM

## 2024-02-20 PROCEDURE — 1036F TOBACCO NON-USER: CPT | Performed by: PHYSICIAN ASSISTANT

## 2024-02-20 PROCEDURE — G8399 PT W/DXA RESULTS DOCUMENT: HCPCS | Performed by: PHYSICIAN ASSISTANT

## 2024-02-20 PROCEDURE — G8427 DOCREV CUR MEDS BY ELIG CLIN: HCPCS | Performed by: PHYSICIAN ASSISTANT

## 2024-02-20 PROCEDURE — G8420 CALC BMI NORM PARAMETERS: HCPCS | Performed by: PHYSICIAN ASSISTANT

## 2024-02-20 PROCEDURE — 1090F PRES/ABSN URINE INCON ASSESS: CPT | Performed by: PHYSICIAN ASSISTANT

## 2024-02-20 PROCEDURE — 99213 OFFICE O/P EST LOW 20 MIN: CPT | Performed by: PHYSICIAN ASSISTANT

## 2024-02-20 PROCEDURE — G8484 FLU IMMUNIZE NO ADMIN: HCPCS | Performed by: PHYSICIAN ASSISTANT

## 2024-02-20 PROCEDURE — 3017F COLORECTAL CA SCREEN DOC REV: CPT | Performed by: PHYSICIAN ASSISTANT

## 2024-02-20 PROCEDURE — 1123F ACP DISCUSS/DSCN MKR DOCD: CPT | Performed by: PHYSICIAN ASSISTANT

## 2024-02-20 ASSESSMENT — ENCOUNTER SYMPTOMS
VOMITING: 0
COUGH: 0
SHORTNESS OF BREATH: 0
COLOR CHANGE: 0

## 2024-02-20 NOTE — PROGRESS NOTES
[facility] ORTHOPEDICS AND SPORTS MEDICINE  [address] [facility]  [address] [place], [place] [id number]  Dept: [phone]  Dept Fax: [phone]        Ambulatory Follow Up      Subjective:   Julisa Mcmillan is a 68 y.o. year old female who presents to our office today for routine followup regarding her   1. Other closed intra-articular fracture of distal end of right radius with routine healing, subsequent encounter        Chief Complaint   Patient presents with    Wrist Pain     Right wrist distal radius fracture       Date of Injury: 1/3/2024    HPI Julisa Mcmillan  is a 68 y.o. Right hand dominant  female who presents today in follow for nondisplaced intra-articular distal radius fracture sustained on 1/3/2024 after falling from a standing height.  The patient was last seen on 1/23/2024 and underwent treatment in the form of recommendations to maintain wrist brace and limit range of motion and lifting.  The patient notes continued improvement with the previous treatment.   She notes that she is not taking any over-the-counter anti-inflammatories or medication for her discomfort.  She does have some limitations in range of motion due to immobilization.  She notes discomfort with pronation and wrist extension.  She denies numbness and or tingling in the right wrist or hand.      Review of Systems   Constitutional:  Negative for activity change and fever.   HENT:  Negative for sneezing.    Respiratory:  Negative for cough and shortness of breath.    Cardiovascular:  Negative for chest pain.   Gastrointestinal:  Negative for vomiting.   Musculoskeletal:  Positive for arthralgias. Negative for joint swelling and myalgias.   Skin:  Negative for color change.   Neurological:  Negative for weakness and numbness.   Psychiatric/Behavioral:  Negative for sleep disturbance.        Objective :   Ht 1.702 m (5' 7.01\")   Wt 61.7 kg (136 lb)   BMI 21.30 kg/m²

## 2024-03-04 ENCOUNTER — HOSPITAL ENCOUNTER (OUTPATIENT)
Dept: OCCUPATIONAL THERAPY | Age: 69
Setting detail: THERAPIES SERIES
Discharge: HOME | End: 2024-03-04
Payer: MEDICARE

## 2024-03-04 PROCEDURE — 97110 THERAPEUTIC EXERCISES: CPT

## 2024-03-04 PROCEDURE — 97165 OT EVAL LOW COMPLEX 30 MIN: CPT

## 2024-03-04 ASSESSMENT — PAIN DESCRIPTION - DESCRIPTORS: DESCRIPTORS: STABBING;ACHING

## 2024-03-04 ASSESSMENT — PAIN DESCRIPTION - LOCATION: LOCATION: WRIST

## 2024-03-04 ASSESSMENT — PAIN DESCRIPTION - ORIENTATION: ORIENTATION: RIGHT

## 2024-03-04 ASSESSMENT — 9 HOLE PEG TEST
TEST_RESULT: FUNCTIONAL
TEST_RESULT: FUNCTIONAL
TESTTIME_SECONDS: 22
TESTTIME_SECONDS: 22

## 2024-03-04 ASSESSMENT — PAIN DESCRIPTION - PAIN TYPE: TYPE: ACUTE PAIN

## 2024-03-04 ASSESSMENT — PAIN SCALES - GENERAL: PAINLEVEL_OUTOF10: 3

## 2024-03-04 NOTE — PROGRESS NOTES
[facility] Fall Risk Assessment    Patient Name:  Julisa Mcmillan  : 1955        Risk Factor Scale  Score   History of Falls [x] Yes  [] No 25  0 25   Secondary Diagnosis [] Yes  [x] No 15  0 0   Ambulatory Aid [] Furniture  [] Crutches/cane/walker  [x] None/bedrest/wheelchair/nurse 30  15  0 0   IV/Heparin Lock [] Yes  [x] No 20  0 0   Gait/Transferring [] Impaired  [] Weak  [x] Normal/bedrest/immobile 20  10  0 0   Mental Status [] Forgets limitations  [x] Oriented to own ability 15  0 0      Total:25     Based on the Assessment score: check the appropriate box.    []  No intervention needed   Low =   Score of 0-24    [x]  Use standard prevention interventions Moderate =  Score of 24-44   [x] Give patient handout and discuss fall prevention strategies   [x] Establish goal of education for patient/family RE: fall prevention strategies    []  Use high risk prevention interventions High = Score of 45 and higher   [] Give patient handout and discuss fall prevention strategies   [] Establish goal of education for patient/family Re: fall prevention strategies   [] Discuss lifeline / other resources    Electronically signed by:   Denise Gold OT  Date: 3/4/2024        
eval:Increase AROM rt UE: forearm supination by 15, wrist extension by 10, wrist UD by 15 to improve motion for home activities (laundry,opening doors, housework), grooming.     Compard to eval: Increase rt hand strength:  by 10#, tip pinch by 4 # to improve strength for meal prep /cutting food.     Compared to eval: Using the UEFI pt will increase total score by 15 points & report less difficulty using rt UE for IADLS.     Increase rt UE strength (forearm supination & pronation, wrist (flexion,extension,RD,UD), & hand (flexion,extension)  to 4/5 so that using rt hand to push down when getting out of chair, opening doors, & carrying a bag/groceries is easier to do.          TREATMENT PLAN       REQUIRES OT FOLLOW-UP: Yes  Type: Outpatient  Treatment Initiated : See OT exercises for details.  Additional Comments: continue OT    Pt. and/or family actively involved in establishing Plan of Care and Goals: Yes   Patient/ Caregiver education and instruction: OT Role, Plan of Care, Home Exercise Program, Fall prevention strategies, Goal setting Patient Education: OT issued & reviewed fall prevention handout with pt reporting good understanding. OT instructed pt in rt wrist AROM (RD,UD, wrist flexion, extension), forearm supination/pronation, ball stretch for wrist rom(flex/extension) ,pressing into sides of ball. Handouts for rom issued to pt and copies put in chart.        Treatment may include any combination of the following: Current Treatment Recommendations: Strengthening, ROM, Patient/Caregiver education & training, Safety education & training     Frequency / Duration:  Patient to be seen 2x/week for 16 visits       Eval Complexity:   Decision Making: Low Complexity  Occupational Profile/History : Low  Assessment(s) that identifies: Low  Exam: 3 performance deficits  Assistance Modification: Low    OT Treatment Completed:  Exercises:     Treatment Reasoning    OT Exercise 1: OT issued /reviewed fall prevention

## 2024-03-07 ENCOUNTER — HOSPITAL ENCOUNTER (OUTPATIENT)
Dept: OCCUPATIONAL THERAPY | Age: 69
Setting detail: THERAPIES SERIES
Discharge: HOME | End: 2024-03-07
Payer: MEDICARE

## 2024-03-07 PROCEDURE — 97140 MANUAL THERAPY 1/> REGIONS: CPT

## 2024-03-07 PROCEDURE — 97110 THERAPEUTIC EXERCISES: CPT

## 2024-03-07 ASSESSMENT — PAIN SCALES - GENERAL: PAINLEVEL_OUTOF10: 0

## 2024-03-07 NOTE — PROGRESS NOTES
[facility] Outpatient Rehabilitation and Therapy  Occupational Therapy  [address]    [place], [place] [address]  Phone: [phone]  Fax: [phone]     Occupational Therapy Daily Treatment note     Occupational Therapy: Daily Note   Patient: Julisa Mcmillan (68 y.o. female)   Examination Date: 2024  No data recorded  No data recorded   :  1955 # of Visits since SOC:   2   MRN: [id number]  CSN: [id number] Start of Care Date: 2024   Insurance: Payor: [facility] MEDICARE / Plan: [facility] MEDICARE COMPLETE / Product Type: *No Product type* /   Insurance ID: [id number] - (Medicare Managed) Secondary Insurance (if applicable):    Insurance Information: [facility] Medicare   Referring Physician: Marianne Gray PA-C Amy E Pfund PA-C   PCP: Timbo Dominguez MD Visits to Date/Visits Approved:     No Show/Cancelled Appts:   /       Medical Diagnosis: Other closed intra-articular fracture of distal end of right radius with routine healing, subsequent encounter [S52.571D] Other closed intra-articular fx of diestal end of rt radius with routine healing (S52.57)  Treatment Diagnosis: Rt UE(forearm,wrist,hand) weakness, stiffness rt forearm & wrist, pain rt wrist (ICD-10 code :M62.81,M79.631, M25.631, M25.531)        SUBJECTIVE EXAMINATION   Pain Level: Pain Screening  Pain Assessment: 0-10  Pain Level: 0    Patient Comments: Subjective: Pt reports that she has been doing the exercises and its helping. If she twist rt wrist/forearm palm up it's difficult.    HEP Compliance: Good        OBJECTIVE EXAMINATION   Restrictions: Restrictions/Precautions: None        TREATMENT     Exercises:     Treatment Reasoning    OT Exercise 2: Massage & PROM rt wrist/hand/forearm, manual resistance 10x each way (wrist/hand: flexion, extension)  OT Exercise 3: juxaciser using rt hand moving over & back 5 sets  OT Exercise 4: yellow 1.5# digiflex :rt hand( gripping 2 sets 10x, each finger  oppositional

## 2024-03-12 ENCOUNTER — HOSPITAL ENCOUNTER (OUTPATIENT)
Dept: OCCUPATIONAL THERAPY | Age: 69
Setting detail: THERAPIES SERIES
Discharge: HOME | End: 2024-03-12
Payer: MEDICARE

## 2024-03-12 PROCEDURE — 97110 THERAPEUTIC EXERCISES: CPT

## 2024-03-12 PROCEDURE — 97140 MANUAL THERAPY 1/> REGIONS: CPT

## 2024-03-12 NOTE — PROGRESS NOTES
[facility] Outpatient Rehabilitation and Therapy  Occupational Therapy  [address]    [place], [place] [address]  Phone: [phone]  Fax: [phone]     Occupational Therapy Daily Treatment note     Occupational Therapy: Daily Note   Patient: Julisa Mcmillan (68 y.o. female)   Examination Date: 2024  No data recorded  No data recorded   :  1955 # of Visits since SOC:   3   MRN: [id number]  CSN: [id number] Start of Care Date: 2024   Insurance: Payor: [facility] MEDICARE / Plan: [facility] MEDICARE COMPLETE / Product Type: *No Product type* /   Insurance ID: [id number] - (Medicare Managed) Secondary Insurance (if applicable):    Insurance Information: [facility] Medicare   Referring Physician: Marianne Gray PA-C Amy E Pfund PA-C   PCP: Timbo Dominguez MD Visits to Date/Visits Approved: 3 / 16    No Show/Cancelled Appts:   /       Medical Diagnosis: Other closed intra-articular fracture of distal end of right radius with routine healing, subsequent encounter [S52.571D] Other closed intra-articular fx of diestal end of rt radius with routine healing (S52.57)  Treatment Diagnosis: Rt UE(forearm,wrist,hand) weakness, stiffness rt forearm & wrist, pain rt wrist (ICD-10 code :M62.81,M79.631, M25.631, M25.531)        SUBJECTIVE EXAMINATION   Pain Level:      Patient Comments: Subjective: Pt states when she does palm up/down pulling /pain at 5 then it decreases.    HEP Compliance: Good        OBJECTIVE EXAMINATION   Restrictions: Restrictions/Precautions: None        TREATMENT     Exercises:     Treatment Reasoning    OT Exercise 2: Massage & PROM rt wrist/hand/forearm, manual resistance 10x each way (wrist/hand: flexion, extension)  OT Exercise 3: juxaciser using rt hand moving over & back 5 sets  OT Exercise 4: yellow 1.5# digiflex :rt hand( gripping 2 sets 10x, each finger  oppositional pinch each finger  2 sets 10x)  OT Exercise 5: orange power web  2 sets 10x each way (rt hand flat

## 2024-03-13 ENCOUNTER — APPOINTMENT (OUTPATIENT)
Dept: OCCUPATIONAL THERAPY | Age: 69
End: 2024-03-13
Payer: MEDICARE

## 2024-03-14 ENCOUNTER — HOSPITAL ENCOUNTER (OUTPATIENT)
Dept: OCCUPATIONAL THERAPY | Age: 69
Setting detail: THERAPIES SERIES
Discharge: HOME | End: 2024-03-14
Payer: MEDICARE

## 2024-03-14 PROCEDURE — 97110 THERAPEUTIC EXERCISES: CPT

## 2024-03-14 PROCEDURE — 97140 MANUAL THERAPY 1/> REGIONS: CPT

## 2024-03-14 ASSESSMENT — PAIN SCALES - GENERAL: PAINLEVEL_OUTOF10: 0

## 2024-03-14 NOTE — PROGRESS NOTES
wrist pain to 0- 1 on 0-10 scale when doing daily home activities & /or exercises for rt wrist/hand/wrist.       2. Compared to eval:Increase AROM rt UE: forearm supination by 15, wrist extension by 10, wrist UD by 15 to improve motion for home activities (laundry,opening doors, housework), grooming.       3. Compard to eval: Increase rt hand strength:  by 10#, tip pinch by 4 # to improve strength for meal prep /cutting food.       4. Compared to eval: Using the UEFI pt will increase total score by 15 points & report less difficulty using rt UE for IADLS.       5. Increase rt UE strength (forearm supination & pronation, wrist (flexion,extension,RD,UD), & hand (flexion,extension)  to 4/5 so that using rt hand to push down when getting out of chair, opening doors, & carrying a bag/groceries is easier to do.                      TREATMENT PLAN   REQUIRES OT FOLLOW-UP: Yes  Type: Outpatient  Plan  Plan Frequency: 2x/week  Plan Weeks: 16 visits  Current Treatment Recommendations: Strengthening, ROM, Patient/Caregiver education & training, Safety education & training  Additional Comments: continue OT       Therapy Time  Individual Time In: 0920  Individual Time Out: 1005  Minutes: 45  Timed Code Treatment Minutes: 45 Minutes       Electronically signed by Denise Gold OT  on 3/14/2024 at 6:08 PM       Treatment Charges: Mins Units Time In/Out   [] Evaluation       []  Low       []  Moderate       []  High      []  Electrical Stim      []  Iontophoresis      []  Paraffin      []  Ultrasound        []  Masage      []  Neuromuscular Re-ed      []  ADL      [x]  Ther Exercise 30 2 09:35-10:05   []  Ther Activities      []  Neuro Re-Ed      []  Splinting      [x]  manual therapy  15 1 09:20-09:35   Total Treatment time 45 min 3          POC NOTE

## 2024-03-15 ENCOUNTER — APPOINTMENT (OUTPATIENT)
Dept: OCCUPATIONAL THERAPY | Age: 69
End: 2024-03-15
Payer: MEDICARE

## 2024-03-28 DIAGNOSIS — I10 ESSENTIAL HYPERTENSION: ICD-10-CM

## 2024-03-28 RX ORDER — LISINOPRIL 10 MG/1
10 TABLET ORAL DAILY
Qty: 90 TABLET | Refills: 0 | Status: SHIPPED | OUTPATIENT
Start: 2024-03-28

## 2024-04-03 ENCOUNTER — HOSPITAL ENCOUNTER (OUTPATIENT)
Dept: OCCUPATIONAL THERAPY | Age: 69
Setting detail: THERAPIES SERIES
Discharge: HOME | End: 2024-04-03
Payer: MEDICARE

## 2024-04-03 PROCEDURE — 97110 THERAPEUTIC EXERCISES: CPT

## 2024-04-03 PROCEDURE — 97140 MANUAL THERAPY 1/> REGIONS: CPT

## 2024-04-03 ASSESSMENT — PAIN DESCRIPTION - DESCRIPTORS: DESCRIPTORS: SORE

## 2024-04-03 ASSESSMENT — PAIN DESCRIPTION - ORIENTATION: ORIENTATION: RIGHT

## 2024-04-03 ASSESSMENT — PAIN SCALES - GENERAL: PAINLEVEL_OUTOF10: 1

## 2024-04-03 ASSESSMENT — PAIN DESCRIPTION - LOCATION: LOCATION: WRIST

## 2024-04-03 ASSESSMENT — PAIN DESCRIPTION - PAIN TYPE: TYPE: ACUTE PAIN

## 2024-04-03 NOTE — PROGRESS NOTES
[facility] Outpatient Rehabilitation and Therapy  Occupational Therapy  [address]    [place], [place] [address]  Phone: [phone]  Fax: [phone]     Occupational Therapy Daily Treatment note     Occupational Therapy: Daily Note   Patient: Julisa Mcmillan (68 y.o. female)   Examination Date: 2024  No data recorded  Progress Note Counter: Appt with Marianne Gray 2024     :  1955 # of Visits since SOC:   5   MRN: [id number]  CSN: [id number] Start of Care Date: 2024   Insurance: Payor: [facility] MEDICARE / Plan: [facility] MEDICARE COMPLETE / Product Type: *No Product type* /   Insurance ID: [id number] - (Medicare Managed) Secondary Insurance (if applicable):    Insurance Information: [facility] Medicare   Referring Physician: Marianne Gray PA-C Amy E Pfund PA-C   PCP: Timbo Dominguez MD Visits to Date/Visits Approved:     No Show/Cancelled Appts:   /       Medical Diagnosis: Other closed intra-articular fracture of distal end of right radius with routine healing, subsequent encounter [S52.571D] Other closed intra-articular fx of distal end of rt radius with routine healing (S52.57)  Treatment Diagnosis: Rt UE(forearm,wrist,hand) weakness, stiffness rt forearm & wrist, pain rt wrist (ICD-10 code :M62.81,M79.631, M25.631, M25.531)        SUBJECTIVE EXAMINATION   Pain Level: Pain Screening  Patient Currently in Pain: Yes  Pain Assessment: 0-10  Pain Level: 1  Post Treatment Pain Level: 0  Pain Type: Acute pain  Pain Location: Wrist  Pain Orientation: Right  Pain Descriptors: Sore    Patient Comments: Subjective: Pt reports only a little pain if she rotates her wrist/forearm too far. Pt states her rt hand lifting if doing better. Pt states that she had a good vacation to [place], [facility]. Pt states she will see referring provider on 2024.    HEP Compliance: Good        OBJECTIVE EXAMINATION   Restrictions: Restrictions/Precautions: None

## 2024-04-05 ENCOUNTER — HOSPITAL ENCOUNTER (OUTPATIENT)
Dept: OCCUPATIONAL THERAPY | Age: 69
Setting detail: THERAPIES SERIES
End: 2024-04-05
Payer: MEDICARE

## 2024-04-08 ENCOUNTER — HOSPITAL ENCOUNTER (OUTPATIENT)
Dept: OCCUPATIONAL THERAPY | Age: 69
Setting detail: THERAPIES SERIES
Discharge: HOME | End: 2024-04-08
Payer: MEDICARE

## 2024-04-08 PROCEDURE — 97110 THERAPEUTIC EXERCISES: CPT

## 2024-04-08 PROCEDURE — 97140 MANUAL THERAPY 1/> REGIONS: CPT

## 2024-04-08 ASSESSMENT — PAIN SCALES - GENERAL: PAINLEVEL_OUTOF10: 0

## 2024-04-08 NOTE — PROGRESS NOTES
activities (laundry,opening doors, housework), grooming.   LTG 2 Current Status: Goal Met : wrist extension, home activities(laundry,open doors, housework),grooming.Goal Partially Met supination. Not Met wrist UD.   LTG Goal 2 Status: Partially met;Met;Not Met;In progress   Long Term Goal 3 Compard to eval: Increase rt hand strength:  by 10#, tip pinch by 4 # to improve strength for meal prep /cutting food.   LTG 3 Current Status: Goal : Partially Met rt . Not Met goal ongoing rt tip pinch.   LTG Goal 3 Status: Partially met;Not Met;In progress   Long Term Goal 4 Compared to eval: Using the UEFI pt will increase total score by 15 points & report less difficulty using rt UE for IADLS.   LTG 4 Current Status: UEFI total score 76 out of possible 80 points (increase by 17 points compared to eval)   LTG Goal 4 Status: Met   Long Term Goal 5 Increase rt UE strength (forearm supination & pronation, wrist (flexion,extension,RD,UD), & hand (flexion,extension)  to 4/5 so that using rt hand to push down when getting out of chair, opening doors, & carrying a bag/groceries is easier to do.   LTG 5 Current Status: Goal Met :forearm pronation & supination, wrist (flexion,extension,RD,UD), hand flexion pushing down to get out of chair, opening doors, carryng groceries. Goal Partially Met : rt hand extension strength.   LTG Goal 5 Status: Partially met;Met;In progress     TREATMENT PLAN   REQUIRES OT FOLLOW-UP: Yes  Type: Outpatient  Plan  Plan Frequency: 2x/week  Plan Weeks: 16 visits  Current Treatment Recommendations: Strengthening, ROM, Patient/Caregiver education & training, Safety education & training  Additional Comments: Pt will see her medical provider tomorrow & discuss whether she can be done with therapy & just do HEP. Pt will contact therapy if therapy is being discontinued.       Therapy Time  Individual Time In: 1118  Individual Time Out: 1158  Minutes: 40  Timed Code Treatment Minutes: 30 Minutes

## 2024-04-09 ENCOUNTER — CLINICAL DOCUMENTATION (OUTPATIENT)
Dept: OCCUPATIONAL THERAPY | Age: 69
End: 2024-04-09

## 2024-04-09 ENCOUNTER — OFFICE VISIT (OUTPATIENT)
Dept: ORTHOPEDIC SURGERY | Age: 69
End: 2024-04-09
Payer: MEDICARE

## 2024-04-09 VITALS — HEIGHT: 67 IN | BODY MASS INDEX: 21.35 KG/M2 | WEIGHT: 136 LBS

## 2024-04-09 DIAGNOSIS — S52.571D OTHER CLOSED INTRA-ARTICULAR FRACTURE OF DISTAL END OF RIGHT RADIUS WITH ROUTINE HEALING, SUBSEQUENT ENCOUNTER: Primary | ICD-10-CM

## 2024-04-09 PROCEDURE — 1123F ACP DISCUSS/DSCN MKR DOCD: CPT | Performed by: PHYSICIAN ASSISTANT

## 2024-04-09 PROCEDURE — 1090F PRES/ABSN URINE INCON ASSESS: CPT | Performed by: PHYSICIAN ASSISTANT

## 2024-04-09 PROCEDURE — G8427 DOCREV CUR MEDS BY ELIG CLIN: HCPCS | Performed by: PHYSICIAN ASSISTANT

## 2024-04-09 PROCEDURE — 99213 OFFICE O/P EST LOW 20 MIN: CPT | Performed by: PHYSICIAN ASSISTANT

## 2024-04-09 PROCEDURE — 3017F COLORECTAL CA SCREEN DOC REV: CPT | Performed by: PHYSICIAN ASSISTANT

## 2024-04-09 PROCEDURE — G8420 CALC BMI NORM PARAMETERS: HCPCS | Performed by: PHYSICIAN ASSISTANT

## 2024-04-09 PROCEDURE — G8399 PT W/DXA RESULTS DOCUMENT: HCPCS | Performed by: PHYSICIAN ASSISTANT

## 2024-04-09 PROCEDURE — 1036F TOBACCO NON-USER: CPT | Performed by: PHYSICIAN ASSISTANT

## 2024-04-09 NOTE — PROGRESS NOTES
[facility] ORTHOPEDICS AND SPORTS MEDICINE  [address] [facility] [address]  [address]  [place] [id number]  Dept: [phone]  Dept Fax: [phone]        Ambulatory Follow Up      Subjective:   Julisa Mcmillan is a 68 y.o. year old female who presents to our office today for routine followup regarding her   1. Other closed intra-articular fracture of distal end of right radius with routine healing, subsequent encounter        Chief Complaint   Patient presents with    Wrist Pain     right       Date of Injury: 1/3/2024    HPI Julisa Mcmillan  is a 68 y.o. Right hand dominant  female who presents today in follow for non-displaced right distal radius fracture sustained on 1/3/2024, therefore she is 3 months post-injury.  The patient was last seen on 2/20/2024 and she was given a referral to occupational therapy to work on range of motion restoration.   The patient notes significant improvement with the previous treatment. She notes improvement in her range of motion with OT and also she no longer has pain within the wrist. She notes that she will be finishing up OT shortly.      Review of Systems   Constitutional:  Negative for activity change and fever.   HENT:  Negative for sneezing.    Respiratory:  Negative for cough and shortness of breath.    Cardiovascular:  Negative for chest pain.   Gastrointestinal:  Negative for vomiting.   Musculoskeletal:  Negative for arthralgias, joint swelling and myalgias.   Skin:  Negative for color change.   Neurological:  Negative for weakness and numbness.   Psychiatric/Behavioral:  Negative for sleep disturbance.        Objective :   Ht 1.702 m (5' 7.01\")   Wt 61.7 kg (136 lb)   BMI 21.30 kg/m²  Body mass index is 21.3 kg/m².  General: Julisa Mcmillan is a 68 y.o. female who is alert and oriented and sitting comfortably in our office.  Ortho Exam  MS:  Bony prominence of the ulna noted. There is no skin erythema,

## 2024-04-09 NOTE — PROGRESS NOTES
[] [facility]  Outpatient Rehabilitation &  Therapy  [address]  [phone]  F: [phone] [] [facility]  Outpatient Rehabilitation &  Therapy  [address]   [address]  P: [phone]  F: [phone] [] [facility]  Outpatient Rehabilitation &  Therapy  [address]  P: [phone]  F: [phone] [] [facility]  Outpatient Rehabilitation &  Therapy  [address]   P: [phone]  F: [phone] [x] [facility]   Outpatient Rehabilitation   & Therapy  [address]  P: [phone]   F: [phone]         Occupational Therapy Discharge Note    Date: 2024      Patient: Julisa Mcmillan  : 1955  MRN: [id number]    Physician: Marianne Gray PA-C  Insurance: [facility] Medicare    Diagnosis: Other closed intra-articular fx of diestal end of rt radius with routine healing (S52.57)    Onset Date: 1/3/2024  Total visits attended:6  Cancels/No shows:0/0  Date of initial visit: 3/4/2024                Date of final visit: 2024      Subjective:  Pain:  [] Yes  [x] No  Location:  N/A Pain Rating: (0-10 scale) 0/10  Pain altered Tx:  [x] No  [] Yes  Action:  Comments:    Objective/Assessment: See 2024 Progress Update for Goal Status.    Treatment to Date:     [x]  Therapeutic Exercise   95473                [x]  Manual Therapy  26245    [x]  Instruction in HEP             Discharge Status:     [] Pt recovered from conditions. Treatment goals were met.    [] Pt received maximum benefit. No further therapy indicated at this time.    [] Pt to continue exercise/home instructions independently.    [] Therapy interrupted due to:    [] Pt has 2 or more no shows/cancels, is discontinued per our policy.    [] Pt has completed prescribed number of treatment sessions.    [x] Other: Pt saw medical provider 2024 and she can be discharged from OT & just do her HEP.         Electronically signed by: Denise

## 2024-04-10 ASSESSMENT — ENCOUNTER SYMPTOMS
COUGH: 0
SHORTNESS OF BREATH: 0
VOMITING: 0
COLOR CHANGE: 0

## 2024-06-13 DIAGNOSIS — I10 ESSENTIAL HYPERTENSION: ICD-10-CM

## 2024-06-13 RX ORDER — LISINOPRIL 10 MG/1
10 TABLET ORAL DAILY
Qty: 90 TABLET | Refills: 3 | OUTPATIENT
Start: 2024-06-13

## 2024-06-13 NOTE — TELEPHONE ENCOUNTER
Made appt for 7/16/24.  States she has plenty of medication until this date.  Disregard fill notice.

## 2024-07-16 ENCOUNTER — OFFICE VISIT (OUTPATIENT)
Dept: PRIMARY CARE CLINIC | Age: 69
End: 2024-07-16
Payer: MEDICARE

## 2024-07-16 VITALS
SYSTOLIC BLOOD PRESSURE: 122 MMHG | HEIGHT: 67 IN | OXYGEN SATURATION: 96 % | WEIGHT: 134.2 LBS | DIASTOLIC BLOOD PRESSURE: 72 MMHG | BODY MASS INDEX: 21.06 KG/M2 | HEART RATE: 63 BPM

## 2024-07-16 DIAGNOSIS — Z00.00 MEDICARE ANNUAL WELLNESS VISIT, SUBSEQUENT: Primary | ICD-10-CM

## 2024-07-16 DIAGNOSIS — Z00.00 ANNUAL PHYSICAL EXAM: ICD-10-CM

## 2024-07-16 DIAGNOSIS — I10 ESSENTIAL HYPERTENSION: ICD-10-CM

## 2024-07-16 DIAGNOSIS — Z13.6 ENCOUNTER FOR LIPID SCREENING FOR CARDIOVASCULAR DISEASE: ICD-10-CM

## 2024-07-16 DIAGNOSIS — Z13.220 ENCOUNTER FOR LIPID SCREENING FOR CARDIOVASCULAR DISEASE: ICD-10-CM

## 2024-07-16 PROCEDURE — 3017F COLORECTAL CA SCREEN DOC REV: CPT | Performed by: FAMILY MEDICINE

## 2024-07-16 PROCEDURE — 1123F ACP DISCUSS/DSCN MKR DOCD: CPT | Performed by: FAMILY MEDICINE

## 2024-07-16 PROCEDURE — 3074F SYST BP LT 130 MM HG: CPT | Performed by: FAMILY MEDICINE

## 2024-07-16 PROCEDURE — G0439 PPPS, SUBSEQ VISIT: HCPCS | Performed by: FAMILY MEDICINE

## 2024-07-16 PROCEDURE — 3078F DIAST BP <80 MM HG: CPT | Performed by: FAMILY MEDICINE

## 2024-07-16 RX ORDER — BUSPIRONE HYDROCHLORIDE 7.5 MG/1
7.5 TABLET ORAL 2 TIMES DAILY
Qty: 180 TABLET | Refills: 3 | Status: SHIPPED | OUTPATIENT
Start: 2024-07-16

## 2024-07-16 RX ORDER — LISINOPRIL 10 MG/1
10 TABLET ORAL DAILY
Qty: 90 TABLET | Refills: 3 | Status: SHIPPED | OUTPATIENT
Start: 2024-07-16

## 2024-07-16 RX ORDER — BUSPIRONE HYDROCHLORIDE 7.5 MG/1
7.5 TABLET ORAL 2 TIMES DAILY
COMMUNITY
End: 2024-07-16

## 2024-07-16 SDOH — ECONOMIC STABILITY: INCOME INSECURITY: HOW HARD IS IT FOR YOU TO PAY FOR THE VERY BASICS LIKE FOOD, HOUSING, MEDICAL CARE, AND HEATING?: NOT HARD AT ALL

## 2024-07-16 SDOH — ECONOMIC STABILITY: FOOD INSECURITY: WITHIN THE PAST 12 MONTHS, YOU WORRIED THAT YOUR FOOD WOULD RUN OUT BEFORE YOU GOT MONEY TO BUY MORE.: NEVER TRUE

## 2024-07-16 SDOH — ECONOMIC STABILITY: FOOD INSECURITY: WITHIN THE PAST 12 MONTHS, THE FOOD YOU BOUGHT JUST DIDN'T LAST AND YOU DIDN'T HAVE MONEY TO GET MORE.: NEVER TRUE

## 2024-07-16 ASSESSMENT — LIFESTYLE VARIABLES
HOW OFTEN DURING THE LAST YEAR HAVE YOU NEEDED AN ALCOHOLIC DRINK FIRST THING IN THE MORNING TO GET YOURSELF GOING AFTER A NIGHT OF HEAVY DRINKING: NEVER
HOW OFTEN DURING THE LAST YEAR HAVE YOU FAILED TO DO WHAT WAS NORMALLY EXPECTED FROM YOU BECAUSE OF DRINKING: NEVER
HOW OFTEN DO YOU HAVE A DRINK CONTAINING ALCOHOL: 4 OR MORE TIMES A WEEK
HOW OFTEN DURING THE LAST YEAR HAVE YOU FOUND THAT YOU WERE NOT ABLE TO STOP DRINKING ONCE YOU HAD STARTED: NEVER
HOW OFTEN DURING THE LAST YEAR HAVE YOU HAD A FEELING OF GUILT OR REMORSE AFTER DRINKING: NEVER
HAVE YOU OR SOMEONE ELSE BEEN INJURED AS A RESULT OF YOUR DRINKING: NO
HOW OFTEN DURING THE LAST YEAR HAVE YOU BEEN UNABLE TO REMEMBER WHAT HAPPENED THE NIGHT BEFORE BECAUSE YOU HAD BEEN DRINKING: NEVER
HAS A RELATIVE, FRIEND, DOCTOR, OR ANOTHER HEALTH PROFESSIONAL EXPRESSED CONCERN ABOUT YOUR DRINKING OR SUGGESTED YOU CUT DOWN: NO
HOW MANY STANDARD DRINKS CONTAINING ALCOHOL DO YOU HAVE ON A TYPICAL DAY: 1 OR 2

## 2024-07-16 ASSESSMENT — PATIENT HEALTH QUESTIONNAIRE - PHQ9
1. LITTLE INTEREST OR PLEASURE IN DOING THINGS: SEVERAL DAYS
2. FEELING DOWN, DEPRESSED OR HOPELESS: SEVERAL DAYS
SUM OF ALL RESPONSES TO PHQ QUESTIONS 1-9: 2
7. TROUBLE CONCENTRATING ON THINGS, SUCH AS READING THE NEWSPAPER OR WATCHING TELEVISION: NOT AT ALL
6. FEELING BAD ABOUT YOURSELF - OR THAT YOU ARE A FAILURE OR HAVE LET YOURSELF OR YOUR FAMILY DOWN: NOT AT ALL
4. FEELING TIRED OR HAVING LITTLE ENERGY: NOT AT ALL
3. TROUBLE FALLING OR STAYING ASLEEP: NOT AT ALL
5. POOR APPETITE OR OVEREATING: NOT AT ALL
9. THOUGHTS THAT YOU WOULD BE BETTER OFF DEAD, OR OF HURTING YOURSELF: NOT AT ALL
SUM OF ALL RESPONSES TO PHQ QUESTIONS 1-9: 2
SUM OF ALL RESPONSES TO PHQ9 QUESTIONS 1 & 2: 2
SUM OF ALL RESPONSES TO PHQ QUESTIONS 1-9: 2
SUM OF ALL RESPONSES TO PHQ QUESTIONS 1-9: 2
8. MOVING OR SPEAKING SO SLOWLY THAT OTHER PEOPLE COULD HAVE NOTICED. OR THE OPPOSITE, BEING SO FIGETY OR RESTLESS THAT YOU HAVE BEEN MOVING AROUND A LOT MORE THAN USUAL: NOT AT ALL

## 2024-07-16 NOTE — PROGRESS NOTES
Medicare Annual Wellness Visit    Julisa Mcmillan is here for Medicare AWV    Assessment & Plan   Medicare annual wellness visit, subsequent  Encounter for lipid screening for cardiovascular disease  -     Lipid, Fasting; Future  Annual physical exam  -     Basic Metabolic Panel, Fasting; Future  -     Hepatic Function Panel; Future  Essential hypertension  -     lisinopril (PRINIVIL;ZESTRIL) 10 MG tablet; Take 1 tablet by mouth daily, Disp-90 tablet, R-3Patient needs appointmentNormal  Recommendations for Preventive Services Due: see orders and patient instructions/AVS.  Recommended screening schedule for the next 5-10 years is provided to the patient in written form: see Patient Instructions/AVS.     Return in about 1 year (around 7/16/2025).     Subjective   The following acute and/or chronic problems were also addressed today:  Depression - stable with Buspar  HTN - stable    Patient's complete Health Risk Assessment and screening values have been reviewed and are found in Flowsheets. The following problems were reviewed today and where indicated follow up appointments were made and/or referrals ordered.    Positive Risk Factor Screenings with Interventions:    Fall Risk:  Do you feel unsteady or are you worried about falling? : (!) yes  2 or more falls in past year?: (!) yes  Fall with injury in past year?: (!) yes     Interventions:    Reviewed medications, home hazards, visual acuity, and co-morbidities that can increase risk for falls  Patient declines any further evaluation or treatment            General HRA Questions:  Select all that apply: (!) Stress    Interventions - Stress:  Patient declined any further interventions or treatment       Poor Eating Habits/Diet:  Do you eat balanced/healthy meals regularly?: (!) No  Interventions:  Patient declines any further evaluation or treatment                      Objective   Vitals:    07/16/24 0923   BP: 122/72   Pulse: 63   SpO2: 96%   Weight: 60.9 kg (134 lb

## 2024-08-06 LAB
ANION GAP SERPL CALCULATED.3IONS-SCNC: NORMAL MMOL/L
BUN / CREAT RATIO: NORMAL
BUN BLDV-MCNC: NORMAL MG/DL
CALCIUM SERPL-MCNC: NORMAL MG/DL
CHLORIDE BLD-SCNC: NORMAL MMOL/L
CHOLESTEROL, FASTING: 194
CO2: NORMAL
CREAT SERPL-MCNC: NORMAL MG/DL
GFR AFRICAN AMERICAN: NORMAL
GFR NON-AFRICAN AMERICAN: NORMAL
GLUCOSE FASTING: 87 MG/DL
HDLC SERPL-MCNC: 77 MG/DL (ref 35–70)
LDL CHOLESTEROL: 96
POTASSIUM SERPL-SCNC: NORMAL MMOL/L
SODIUM BLD-SCNC: NORMAL MMOL/L
TRIGLYCERIDE, FASTING: 107

## 2024-08-07 DIAGNOSIS — Z00.00 ANNUAL PHYSICAL EXAM: ICD-10-CM

## 2024-08-07 DIAGNOSIS — Z13.220 ENCOUNTER FOR LIPID SCREENING FOR CARDIOVASCULAR DISEASE: ICD-10-CM

## 2024-08-07 DIAGNOSIS — Z13.6 ENCOUNTER FOR LIPID SCREENING FOR CARDIOVASCULAR DISEASE: ICD-10-CM

## 2024-12-19 RX ORDER — FLUOXETINE 40 MG/1
40 CAPSULE ORAL DAILY
Qty: 90 CAPSULE | Refills: 3 | Status: SHIPPED | OUTPATIENT
Start: 2024-12-19

## 2025-03-05 DIAGNOSIS — I10 ESSENTIAL HYPERTENSION: ICD-10-CM

## 2025-03-05 RX ORDER — LISINOPRIL 10 MG/1
10 TABLET ORAL DAILY
Qty: 90 TABLET | Refills: 1 | Status: SHIPPED | OUTPATIENT
Start: 2025-03-05

## 2025-08-18 SDOH — HEALTH STABILITY: PHYSICAL HEALTH: ON AVERAGE, HOW MANY MINUTES DO YOU ENGAGE IN EXERCISE AT THIS LEVEL?: 30 MIN

## 2025-08-18 SDOH — ECONOMIC STABILITY: INCOME INSECURITY: IN THE LAST 12 MONTHS, WAS THERE A TIME WHEN YOU WERE NOT ABLE TO PAY THE MORTGAGE OR RENT ON TIME?: NO

## 2025-08-18 SDOH — ECONOMIC STABILITY: FOOD INSECURITY: WITHIN THE PAST 12 MONTHS, YOU WORRIED THAT YOUR FOOD WOULD RUN OUT BEFORE YOU GOT MONEY TO BUY MORE.: NEVER TRUE

## 2025-08-18 SDOH — HEALTH STABILITY: PHYSICAL HEALTH: ON AVERAGE, HOW MANY DAYS PER WEEK DO YOU ENGAGE IN MODERATE TO STRENUOUS EXERCISE (LIKE A BRISK WALK)?: 3 DAYS

## 2025-08-18 SDOH — ECONOMIC STABILITY: FOOD INSECURITY: WITHIN THE PAST 12 MONTHS, THE FOOD YOU BOUGHT JUST DIDN'T LAST AND YOU DIDN'T HAVE MONEY TO GET MORE.: NEVER TRUE

## 2025-08-18 ASSESSMENT — LIFESTYLE VARIABLES
HOW OFTEN DO YOU HAVE A DRINK CONTAINING ALCOHOL: 2-3 TIMES A WEEK
HOW OFTEN DO YOU HAVE SIX OR MORE DRINKS ON ONE OCCASION: 1
HOW MANY STANDARD DRINKS CONTAINING ALCOHOL DO YOU HAVE ON A TYPICAL DAY: 1
HOW MANY STANDARD DRINKS CONTAINING ALCOHOL DO YOU HAVE ON A TYPICAL DAY: 1 OR 2
HOW OFTEN DO YOU HAVE A DRINK CONTAINING ALCOHOL: 4

## 2025-08-18 ASSESSMENT — PATIENT HEALTH QUESTIONNAIRE - PHQ9
4. FEELING TIRED OR HAVING LITTLE ENERGY: NOT AT ALL
3. TROUBLE FALLING OR STAYING ASLEEP: NOT AT ALL
2. FEELING DOWN, DEPRESSED OR HOPELESS: SEVERAL DAYS
7. TROUBLE CONCENTRATING ON THINGS, SUCH AS READING THE NEWSPAPER OR WATCHING TELEVISION: NOT AT ALL
8. MOVING OR SPEAKING SO SLOWLY THAT OTHER PEOPLE COULD HAVE NOTICED. OR THE OPPOSITE, BEING SO FIGETY OR RESTLESS THAT YOU HAVE BEEN MOVING AROUND A LOT MORE THAN USUAL: NOT AT ALL
6. FEELING BAD ABOUT YOURSELF - OR THAT YOU ARE A FAILURE OR HAVE LET YOURSELF OR YOUR FAMILY DOWN: NOT AT ALL
1. LITTLE INTEREST OR PLEASURE IN DOING THINGS: SEVERAL DAYS
SUM OF ALL RESPONSES TO PHQ QUESTIONS 1-9: 2
5. POOR APPETITE OR OVEREATING: NOT AT ALL
SUM OF ALL RESPONSES TO PHQ QUESTIONS 1-9: 2
9. THOUGHTS THAT YOU WOULD BE BETTER OFF DEAD, OR OF HURTING YOURSELF: NOT AT ALL
SUM OF ALL RESPONSES TO PHQ QUESTIONS 1-9: 2
SUM OF ALL RESPONSES TO PHQ QUESTIONS 1-9: 2
10. IF YOU CHECKED OFF ANY PROBLEMS, HOW DIFFICULT HAVE THESE PROBLEMS MADE IT FOR YOU TO DO YOUR WORK, TAKE CARE OF THINGS AT HOME, OR GET ALONG WITH OTHER PEOPLE: NOT DIFFICULT AT ALL

## 2025-08-25 ENCOUNTER — OFFICE VISIT (OUTPATIENT)
Dept: PRIMARY CARE CLINIC | Age: 70
End: 2025-08-25
Payer: MEDICARE

## 2025-08-25 ENCOUNTER — OFFICE VISIT (OUTPATIENT)
Dept: BEHAVIORAL/MENTAL HEALTH CLINIC | Age: 70
End: 2025-08-25
Payer: MEDICARE

## 2025-08-25 VITALS
SYSTOLIC BLOOD PRESSURE: 136 MMHG | WEIGHT: 136.8 LBS | OXYGEN SATURATION: 96 % | BODY MASS INDEX: 21.47 KG/M2 | DIASTOLIC BLOOD PRESSURE: 84 MMHG | HEART RATE: 73 BPM | HEIGHT: 67 IN

## 2025-08-25 DIAGNOSIS — Z00.00 MEDICARE ANNUAL WELLNESS VISIT, SUBSEQUENT: Primary | ICD-10-CM

## 2025-08-25 DIAGNOSIS — Z00.00 ANNUAL PHYSICAL EXAM: ICD-10-CM

## 2025-08-25 DIAGNOSIS — F32.A DEPRESSION, UNSPECIFIED DEPRESSION TYPE: ICD-10-CM

## 2025-08-25 DIAGNOSIS — F42.9 OBSESSIVE-COMPULSIVE DISORDER, UNSPECIFIED TYPE: ICD-10-CM

## 2025-08-25 DIAGNOSIS — R79.9 ABNORMAL BLOOD CHEMISTRY: ICD-10-CM

## 2025-08-25 DIAGNOSIS — I10 ESSENTIAL HYPERTENSION: ICD-10-CM

## 2025-08-25 DIAGNOSIS — F43.81 PROLONGED GRIEF DISORDER: Primary | ICD-10-CM

## 2025-08-25 DIAGNOSIS — L23.7 ALLERGIC CONTACT DERMATITIS DUE TO PLANTS, EXCEPT FOOD: ICD-10-CM

## 2025-08-25 PROBLEM — M81.0 AGE-RELATED OSTEOPOROSIS WITHOUT CURRENT PATHOLOGICAL FRACTURE: Status: ACTIVE | Noted: 2025-03-27

## 2025-08-25 PROCEDURE — 90791 PSYCH DIAGNOSTIC EVALUATION: CPT | Performed by: COUNSELOR

## 2025-08-25 PROCEDURE — 1159F MED LIST DOCD IN RCRD: CPT | Performed by: FAMILY MEDICINE

## 2025-08-25 PROCEDURE — 3079F DIAST BP 80-89 MM HG: CPT | Performed by: FAMILY MEDICINE

## 2025-08-25 PROCEDURE — G0439 PPPS, SUBSEQ VISIT: HCPCS | Performed by: FAMILY MEDICINE

## 2025-08-25 PROCEDURE — 1123F ACP DISCUSS/DSCN MKR DOCD: CPT | Performed by: COUNSELOR

## 2025-08-25 PROCEDURE — 3075F SYST BP GE 130 - 139MM HG: CPT | Performed by: FAMILY MEDICINE

## 2025-08-25 PROCEDURE — 3017F COLORECTAL CA SCREEN DOC REV: CPT | Performed by: FAMILY MEDICINE

## 2025-08-25 PROCEDURE — 1123F ACP DISCUSS/DSCN MKR DOCD: CPT | Performed by: FAMILY MEDICINE

## 2025-08-25 RX ORDER — FLUOCINONIDE 0.5 MG/G
CREAM TOPICAL
Qty: 30 G | Refills: 1 | Status: SHIPPED | OUTPATIENT
Start: 2025-08-25

## 2025-08-25 ASSESSMENT — PATIENT HEALTH QUESTIONNAIRE - PHQ9
2. FEELING DOWN, DEPRESSED OR HOPELESS: MORE THAN HALF THE DAYS
1. LITTLE INTEREST OR PLEASURE IN DOING THINGS: NEARLY EVERY DAY
SUM OF ALL RESPONSES TO PHQ QUESTIONS 1-9: 8
7. TROUBLE CONCENTRATING ON THINGS, SUCH AS READING THE NEWSPAPER OR WATCHING TELEVISION: SEVERAL DAYS
SUM OF ALL RESPONSES TO PHQ QUESTIONS 1-9: 8
5. POOR APPETITE OR OVEREATING: NOT AT ALL
8. MOVING OR SPEAKING SO SLOWLY THAT OTHER PEOPLE COULD HAVE NOTICED. OR THE OPPOSITE, BEING SO FIGETY OR RESTLESS THAT YOU HAVE BEEN MOVING AROUND A LOT MORE THAN USUAL: NOT AT ALL
6. FEELING BAD ABOUT YOURSELF - OR THAT YOU ARE A FAILURE OR HAVE LET YOURSELF OR YOUR FAMILY DOWN: NOT AT ALL
9. THOUGHTS THAT YOU WOULD BE BETTER OFF DEAD, OR OF HURTING YOURSELF: NOT AT ALL
4. FEELING TIRED OR HAVING LITTLE ENERGY: SEVERAL DAYS
SUM OF ALL RESPONSES TO PHQ QUESTIONS 1-9: 8
3. TROUBLE FALLING OR STAYING ASLEEP: SEVERAL DAYS
10. IF YOU CHECKED OFF ANY PROBLEMS, HOW DIFFICULT HAVE THESE PROBLEMS MADE IT FOR YOU TO DO YOUR WORK, TAKE CARE OF THINGS AT HOME, OR GET ALONG WITH OTHER PEOPLE: SOMEWHAT DIFFICULT
SUM OF ALL RESPONSES TO PHQ QUESTIONS 1-9: 8

## 2025-08-29 ENCOUNTER — TELEPHONE (OUTPATIENT)
Dept: PRIMARY CARE CLINIC | Age: 70
End: 2025-08-29